# Patient Record
Sex: FEMALE | Race: BLACK OR AFRICAN AMERICAN | NOT HISPANIC OR LATINO | Employment: UNEMPLOYED | URBAN - METROPOLITAN AREA
[De-identification: names, ages, dates, MRNs, and addresses within clinical notes are randomized per-mention and may not be internally consistent; named-entity substitution may affect disease eponyms.]

---

## 2023-04-04 ENCOUNTER — APPOINTMENT (OUTPATIENT)
Dept: PRIMARY CARE | Facility: CLINIC | Age: 63
End: 2023-04-04
Payer: COMMERCIAL

## 2023-04-04 PROBLEM — I10 BENIGN ESSENTIAL HYPERTENSION: Status: ACTIVE | Noted: 2023-04-04

## 2023-04-04 PROBLEM — J45.909 ASTHMA (HHS-HCC): Status: ACTIVE | Noted: 2023-04-04

## 2023-04-04 PROBLEM — M54.9 BACK PAIN: Status: ACTIVE | Noted: 2023-04-04

## 2023-04-04 PROBLEM — H53.001 AMBLYOPIA, RIGHT EYE: Status: ACTIVE | Noted: 2023-04-04

## 2023-04-04 PROBLEM — H50.111 EXOTROPIA, RIGHT EYE: Status: ACTIVE | Noted: 2023-04-04

## 2023-04-04 PROBLEM — B96.89 BACTERIAL VAGINOSIS: Status: ACTIVE | Noted: 2023-04-04

## 2023-04-04 PROBLEM — R39.9 UTI SYMPTOMS: Status: ACTIVE | Noted: 2023-04-04

## 2023-04-04 PROBLEM — R29.898 WEAKNESS OF LEFT LOWER EXTREMITY: Status: ACTIVE | Noted: 2023-04-04

## 2023-04-04 PROBLEM — N89.8 VAGINAL DISCHARGE: Status: ACTIVE | Noted: 2023-04-04

## 2023-04-04 PROBLEM — J30.9 ALLERGIC RHINITIS: Status: ACTIVE | Noted: 2023-04-04

## 2023-04-04 PROBLEM — N89.8 VAGINAL ITCHING: Status: ACTIVE | Noted: 2023-04-04

## 2023-04-04 PROBLEM — B37.9 YEAST INFECTION: Status: ACTIVE | Noted: 2023-04-04

## 2023-04-04 PROBLEM — R07.89 ATYPICAL CHEST PAIN: Status: ACTIVE | Noted: 2023-04-04

## 2023-04-04 PROBLEM — F41.8 DEPRESSION WITH ANXIETY: Status: ACTIVE | Noted: 2023-04-04

## 2023-04-04 PROBLEM — M79.605 LEFT LEG PAIN: Status: ACTIVE | Noted: 2023-04-04

## 2023-04-04 PROBLEM — H33.301 RETINAL HOLE OR TEAR, RIGHT: Status: ACTIVE | Noted: 2023-04-04

## 2023-04-04 PROBLEM — Q14.1 CONGENITAL HYPERTROPHY OF RETINAL PIGMENT EPITHELIUM: Status: ACTIVE | Noted: 2023-04-04

## 2023-04-04 PROBLEM — F43.9 STRESS: Status: ACTIVE | Noted: 2023-04-04

## 2023-04-04 PROBLEM — L30.4 INTERTRIGO: Status: ACTIVE | Noted: 2023-04-04

## 2023-04-04 PROBLEM — K59.00 CONSTIPATION: Status: ACTIVE | Noted: 2023-04-04

## 2023-04-04 PROBLEM — H25.813 COMBINED FORM OF SENILE CATARACT OF BOTH EYES: Status: ACTIVE | Noted: 2023-04-04

## 2023-04-04 PROBLEM — M23.303 DERANGEMENT OF MEDIAL MENISCUS OF RIGHT KNEE: Status: ACTIVE | Noted: 2023-04-04

## 2023-04-04 PROBLEM — M25.512 LEFT SHOULDER PAIN: Status: ACTIVE | Noted: 2023-04-04

## 2023-04-04 PROBLEM — T14.8XXA BRUISING: Status: ACTIVE | Noted: 2023-04-04

## 2023-04-04 PROBLEM — E11.9 DIABETES MELLITUS TYPE 2 WITHOUT RETINOPATHY (MULTI): Status: ACTIVE | Noted: 2023-04-04

## 2023-04-04 PROBLEM — M25.561 RIGHT KNEE PAIN: Status: ACTIVE | Noted: 2023-04-04

## 2023-04-04 PROBLEM — H40.003 GLAUCOMA SUSPECT, BOTH EYES: Status: ACTIVE | Noted: 2023-04-04

## 2023-04-04 PROBLEM — N76.0 BACTERIAL VAGINOSIS: Status: ACTIVE | Noted: 2023-04-04

## 2023-04-04 PROBLEM — M25.552 LEFT HIP PAIN: Status: ACTIVE | Noted: 2023-04-04

## 2023-04-04 PROBLEM — R30.0 DYSURIA: Status: ACTIVE | Noted: 2023-04-04

## 2023-04-04 PROBLEM — R05.3 CHRONIC COUGH: Status: ACTIVE | Noted: 2023-04-04

## 2023-04-04 PROBLEM — M54.10 RADICULOPATHY OF LEG: Status: ACTIVE | Noted: 2023-04-04

## 2023-04-04 PROBLEM — Z96.1 PSEUDOPHAKIA: Status: ACTIVE | Noted: 2023-04-04

## 2023-04-04 PROBLEM — K21.9 GERD (GASTROESOPHAGEAL REFLUX DISEASE): Status: ACTIVE | Noted: 2023-04-04

## 2023-04-04 PROBLEM — N39.0 UTI (URINARY TRACT INFECTION): Status: ACTIVE | Noted: 2023-04-04

## 2023-04-04 PROBLEM — M17.11 PRIMARY LOCALIZED OSTEOARTHROSIS OF RIGHT LOWER LEG: Status: ACTIVE | Noted: 2023-04-04

## 2023-04-04 PROBLEM — G47.00 INSOMNIA: Status: ACTIVE | Noted: 2023-04-04

## 2023-04-04 PROBLEM — E66.9 OBESITY (BMI 30-39.9): Status: ACTIVE | Noted: 2023-04-04

## 2023-04-04 PROBLEM — M10.9 GOUT: Status: ACTIVE | Noted: 2023-04-04

## 2023-04-04 PROBLEM — E11.40 DIABETIC NEUROPATHY (MULTI): Status: ACTIVE | Noted: 2023-04-04

## 2023-04-04 PROBLEM — M1A.0510 CHRONIC GOUT OF RIGHT HIP: Status: ACTIVE | Noted: 2023-04-04

## 2023-04-04 PROBLEM — H02.889 MGD (MEIBOMIAN GLAND DISEASE): Status: ACTIVE | Noted: 2023-04-04

## 2023-04-04 PROBLEM — R82.90 BAD ODOR OF URINE: Status: ACTIVE | Noted: 2023-04-04

## 2023-04-04 RX ORDER — FLUTICASONE PROPIONATE 50 MCG
1-2 SPRAY, SUSPENSION (ML) NASAL DAILY
COMMUNITY
Start: 2020-07-07 | End: 2023-04-20 | Stop reason: SDUPTHER

## 2023-04-04 RX ORDER — PREDNISOLONE ACETATE 10 MG/ML
SUSPENSION/ DROPS OPHTHALMIC
COMMUNITY
Start: 2022-05-16 | End: 2024-01-26 | Stop reason: ALTCHOICE

## 2023-04-04 RX ORDER — DULAGLUTIDE 0.75 MG/.5ML
0.75 INJECTION, SOLUTION SUBCUTANEOUS
COMMUNITY
Start: 2022-07-19 | End: 2023-04-20 | Stop reason: SDUPTHER

## 2023-04-04 RX ORDER — IBUPROFEN 800 MG/1
1 TABLET ORAL 3 TIMES DAILY PRN
COMMUNITY
Start: 2021-07-22 | End: 2023-04-20 | Stop reason: SDUPTHER

## 2023-04-04 RX ORDER — CETIRIZINE HYDROCHLORIDE 10 MG/1
1 TABLET ORAL NIGHTLY
COMMUNITY
Start: 2013-08-21 | End: 2023-04-20 | Stop reason: SDUPTHER

## 2023-04-04 RX ORDER — BLOOD SUGAR DIAGNOSTIC
STRIP MISCELLANEOUS
COMMUNITY
End: 2024-01-26 | Stop reason: WASHOUT

## 2023-04-04 RX ORDER — BLOOD-GLUCOSE METER
KIT MISCELLANEOUS
COMMUNITY
Start: 2015-11-09 | End: 2023-04-20 | Stop reason: SDUPTHER

## 2023-04-04 RX ORDER — COLCHICINE 0.6 MG/1
TABLET ORAL
COMMUNITY
Start: 2021-07-22 | End: 2023-04-20 | Stop reason: SDUPTHER

## 2023-04-04 RX ORDER — AMLODIPINE BESYLATE 10 MG/1
1 TABLET ORAL DAILY
COMMUNITY
Start: 2014-08-05 | End: 2023-04-20 | Stop reason: SDUPTHER

## 2023-04-04 RX ORDER — BLOOD-GLUCOSE CONTROL, NORMAL
EACH MISCELLANEOUS
COMMUNITY
End: 2024-01-26 | Stop reason: WASHOUT

## 2023-04-04 RX ORDER — ACETAMINOPHEN 500 MG
2 TABLET ORAL EVERY 8 HOURS PRN
COMMUNITY
End: 2023-12-22 | Stop reason: WASHOUT

## 2023-04-04 RX ORDER — ATENOLOL AND CHLORTHALIDONE TABLET 50; 25 MG/1; MG/1
1 TABLET ORAL DAILY
COMMUNITY
Start: 2013-08-07 | End: 2023-04-20 | Stop reason: SDUPTHER

## 2023-04-04 RX ORDER — MONTELUKAST SODIUM 10 MG/1
1 TABLET ORAL NIGHTLY
COMMUNITY
Start: 2018-09-04 | End: 2023-04-20 | Stop reason: SDUPTHER

## 2023-04-04 RX ORDER — BUDESONIDE AND FORMOTEROL FUMARATE DIHYDRATE 160; 4.5 UG/1; UG/1
2 AEROSOL RESPIRATORY (INHALATION)
COMMUNITY
Start: 2020-10-07 | End: 2023-04-20 | Stop reason: SDUPTHER

## 2023-04-04 RX ORDER — GUAIFENESIN AND DEXTROMETHORPHAN HYDROBROMIDE 10; 100 MG/5ML; MG/5ML
10 SYRUP ORAL 3 TIMES DAILY PRN
COMMUNITY
Start: 2022-01-07

## 2023-04-04 RX ORDER — ISOPROPYL ALCOHOL 70 ML/100ML
SWAB TOPICAL
COMMUNITY
End: 2024-01-26 | Stop reason: WASHOUT

## 2023-04-04 RX ORDER — ALBUTEROL SULFATE 90 UG/1
1-2 AEROSOL, METERED RESPIRATORY (INHALATION) EVERY 6 HOURS PRN
COMMUNITY
Start: 2020-03-23 | End: 2023-04-20 | Stop reason: SDUPTHER

## 2023-04-04 RX ORDER — ALBUTEROL SULFATE 0.83 MG/ML
SOLUTION RESPIRATORY (INHALATION)
COMMUNITY
End: 2023-12-22 | Stop reason: SDUPTHER

## 2023-04-04 RX ORDER — TRAZODONE HYDROCHLORIDE 100 MG/1
1 TABLET ORAL NIGHTLY
COMMUNITY
Start: 2013-07-09 | End: 2023-04-20 | Stop reason: SDUPTHER

## 2023-04-04 RX ORDER — FLUTICASONE PROPIONATE 100 UG/1
1 POWDER, METERED RESPIRATORY (INHALATION) 2 TIMES DAILY
COMMUNITY
Start: 2018-12-04 | End: 2023-04-20 | Stop reason: SDUPTHER

## 2023-04-04 RX ORDER — FAMOTIDINE 20 MG/1
1 TABLET, FILM COATED ORAL DAILY PRN
COMMUNITY
Start: 2018-12-04 | End: 2023-04-20 | Stop reason: SDUPTHER

## 2023-04-04 RX ORDER — ATORVASTATIN CALCIUM 20 MG/1
80 TABLET, FILM COATED ORAL DAILY
COMMUNITY
Start: 2018-12-04 | End: 2023-04-20 | Stop reason: SDUPTHER

## 2023-04-14 ENCOUNTER — APPOINTMENT (OUTPATIENT)
Dept: PRIMARY CARE | Facility: CLINIC | Age: 63
End: 2023-04-14
Payer: COMMERCIAL

## 2023-04-20 ENCOUNTER — OFFICE VISIT (OUTPATIENT)
Dept: PRIMARY CARE | Facility: CLINIC | Age: 63
End: 2023-04-20
Payer: COMMERCIAL

## 2023-04-20 ENCOUNTER — LAB (OUTPATIENT)
Dept: LAB | Facility: LAB | Age: 63
End: 2023-04-20
Payer: COMMERCIAL

## 2023-04-20 VITALS
HEART RATE: 73 BPM | TEMPERATURE: 97.4 F | DIASTOLIC BLOOD PRESSURE: 83 MMHG | BODY MASS INDEX: 37.37 KG/M2 | WEIGHT: 224.3 LBS | HEIGHT: 65 IN | SYSTOLIC BLOOD PRESSURE: 143 MMHG | OXYGEN SATURATION: 98 %

## 2023-04-20 DIAGNOSIS — Z12.31 BREAST CANCER SCREENING BY MAMMOGRAM: ICD-10-CM

## 2023-04-20 DIAGNOSIS — I10 BENIGN ESSENTIAL HYPERTENSION: ICD-10-CM

## 2023-04-20 DIAGNOSIS — M25.569 KNEE PAIN, UNSPECIFIED CHRONICITY, UNSPECIFIED LATERALITY: ICD-10-CM

## 2023-04-20 DIAGNOSIS — K21.9 GASTROESOPHAGEAL REFLUX DISEASE, UNSPECIFIED WHETHER ESOPHAGITIS PRESENT: ICD-10-CM

## 2023-04-20 DIAGNOSIS — Z59.41 FOOD INSECURITY: ICD-10-CM

## 2023-04-20 DIAGNOSIS — M10.9 GOUT, UNSPECIFIED CAUSE, UNSPECIFIED CHRONICITY, UNSPECIFIED SITE: ICD-10-CM

## 2023-04-20 DIAGNOSIS — E11.9 DIABETES MELLITUS TYPE 2 WITHOUT RETINOPATHY (MULTI): Primary | ICD-10-CM

## 2023-04-20 DIAGNOSIS — G47.00 INSOMNIA, UNSPECIFIED TYPE: ICD-10-CM

## 2023-04-20 DIAGNOSIS — R53.83 FATIGUE, UNSPECIFIED TYPE: ICD-10-CM

## 2023-04-20 DIAGNOSIS — T78.40XD ALLERGY, SUBSEQUENT ENCOUNTER: ICD-10-CM

## 2023-04-20 DIAGNOSIS — J45.909 ASTHMA, UNSPECIFIED ASTHMA SEVERITY, UNSPECIFIED WHETHER COMPLICATED, UNSPECIFIED WHETHER PERSISTENT (HHS-HCC): ICD-10-CM

## 2023-04-20 DIAGNOSIS — R06.09 DOE (DYSPNEA ON EXERTION): ICD-10-CM

## 2023-04-20 DIAGNOSIS — E11.9 DIABETES MELLITUS TYPE 2 WITHOUT RETINOPATHY (MULTI): ICD-10-CM

## 2023-04-20 DIAGNOSIS — E78.5 HYPERLIPIDEMIA, UNSPECIFIED HYPERLIPIDEMIA TYPE: ICD-10-CM

## 2023-04-20 LAB
ESTIMATED AVERAGE GLUCOSE FOR HBA1C: 154 MG/DL
HEMOGLOBIN A1C/HEMOGLOBIN TOTAL IN BLOOD: 7 %

## 2023-04-20 PROCEDURE — 3079F DIAST BP 80-89 MM HG: CPT | Performed by: STUDENT IN AN ORGANIZED HEALTH CARE EDUCATION/TRAINING PROGRAM

## 2023-04-20 PROCEDURE — 3077F SYST BP >= 140 MM HG: CPT | Performed by: STUDENT IN AN ORGANIZED HEALTH CARE EDUCATION/TRAINING PROGRAM

## 2023-04-20 PROCEDURE — 99214 OFFICE O/P EST MOD 30 MIN: CPT | Performed by: STUDENT IN AN ORGANIZED HEALTH CARE EDUCATION/TRAINING PROGRAM

## 2023-04-20 PROCEDURE — 3051F HG A1C>EQUAL 7.0%<8.0%: CPT | Performed by: STUDENT IN AN ORGANIZED HEALTH CARE EDUCATION/TRAINING PROGRAM

## 2023-04-20 PROCEDURE — 83036 HEMOGLOBIN GLYCOSYLATED A1C: CPT

## 2023-04-20 PROCEDURE — 36415 COLL VENOUS BLD VENIPUNCTURE: CPT

## 2023-04-20 PROCEDURE — 1036F TOBACCO NON-USER: CPT | Performed by: STUDENT IN AN ORGANIZED HEALTH CARE EDUCATION/TRAINING PROGRAM

## 2023-04-20 RX ORDER — ATENOLOL AND CHLORTHALIDONE TABLET 50; 25 MG/1; MG/1
1 TABLET ORAL DAILY
Qty: 90 TABLET | Refills: 3 | Status: SHIPPED | OUTPATIENT
Start: 2023-04-20 | End: 2023-11-01 | Stop reason: SDUPTHER

## 2023-04-20 RX ORDER — CETIRIZINE HYDROCHLORIDE 10 MG/1
10 TABLET ORAL NIGHTLY
Qty: 90 TABLET | Refills: 1 | Status: SHIPPED | OUTPATIENT
Start: 2023-04-20 | End: 2023-11-01 | Stop reason: SDUPTHER

## 2023-04-20 RX ORDER — BUDESONIDE AND FORMOTEROL FUMARATE DIHYDRATE 160; 4.5 UG/1; UG/1
2 AEROSOL RESPIRATORY (INHALATION)
Qty: 10.2 G | Refills: 0 | Status: SHIPPED | OUTPATIENT
Start: 2023-04-20 | End: 2023-07-21

## 2023-04-20 RX ORDER — FLASH GLUCOSE SENSOR
KIT MISCELLANEOUS
Qty: 1 EACH | Refills: 0 | Status: SHIPPED | OUTPATIENT
Start: 2023-04-20 | End: 2024-01-26 | Stop reason: SDUPTHER

## 2023-04-20 RX ORDER — DULAGLUTIDE 0.75 MG/.5ML
0.75 INJECTION, SOLUTION SUBCUTANEOUS
Qty: 4 EACH | Refills: 2 | Status: SHIPPED | OUTPATIENT
Start: 2023-04-20 | End: 2023-06-20 | Stop reason: SDUPTHER

## 2023-04-20 RX ORDER — MONTELUKAST SODIUM 10 MG/1
10 TABLET ORAL NIGHTLY
Qty: 90 TABLET | Refills: 1 | Status: SHIPPED | OUTPATIENT
Start: 2023-04-20 | End: 2023-12-22 | Stop reason: SDUPTHER

## 2023-04-20 RX ORDER — AMLODIPINE BESYLATE 10 MG/1
10 TABLET ORAL DAILY
Qty: 90 TABLET | Refills: 3 | Status: SHIPPED | OUTPATIENT
Start: 2023-04-20 | End: 2023-11-01 | Stop reason: SDUPTHER

## 2023-04-20 RX ORDER — TRAZODONE HYDROCHLORIDE 100 MG/1
100 TABLET ORAL NIGHTLY
Qty: 90 TABLET | Refills: 0 | Status: SHIPPED | OUTPATIENT
Start: 2023-04-20 | End: 2023-12-22 | Stop reason: SDUPTHER

## 2023-04-20 RX ORDER — BLOOD-GLUCOSE METER
1 KIT MISCELLANEOUS
Qty: 200 STRIP | Refills: 3 | Status: SHIPPED | OUTPATIENT
Start: 2023-04-20

## 2023-04-20 RX ORDER — FLUTICASONE PROPIONATE 50 MCG
1-2 SPRAY, SUSPENSION (ML) NASAL DAILY
Qty: 16 G | Refills: 0 | Status: SHIPPED | OUTPATIENT
Start: 2023-04-20

## 2023-04-20 RX ORDER — FLUTICASONE PROPIONATE 100 UG/1
1 POWDER, METERED RESPIRATORY (INHALATION)
Qty: 60 EACH | Refills: 3 | Status: SHIPPED | OUTPATIENT
Start: 2023-04-20 | End: 2023-12-22 | Stop reason: ALTCHOICE

## 2023-04-20 RX ORDER — COLCHICINE 0.6 MG/1
0.6 TABLET ORAL 2 TIMES DAILY
Qty: 90 TABLET | Refills: 0 | Status: SHIPPED | OUTPATIENT
Start: 2023-04-20 | End: 2023-11-01 | Stop reason: SDUPTHER

## 2023-04-20 RX ORDER — INSULIN PUMP SYRINGE, 3 ML
EACH MISCELLANEOUS
Qty: 1 EACH | Refills: 0 | Status: SHIPPED | OUTPATIENT
Start: 2023-04-20 | End: 2024-01-26 | Stop reason: WASHOUT

## 2023-04-20 RX ORDER — ATORVASTATIN CALCIUM 20 MG/1
80 TABLET, FILM COATED ORAL DAILY
Qty: 90 TABLET | Refills: 3 | Status: SHIPPED | OUTPATIENT
Start: 2023-04-20 | End: 2023-12-22 | Stop reason: SDUPTHER

## 2023-04-20 RX ORDER — FAMOTIDINE 20 MG/1
20 TABLET, FILM COATED ORAL DAILY PRN
Qty: 90 TABLET | Refills: 3 | Status: SHIPPED | OUTPATIENT
Start: 2023-04-20 | End: 2023-12-22 | Stop reason: SDUPTHER

## 2023-04-20 RX ORDER — IBUPROFEN 800 MG/1
800 TABLET ORAL 3 TIMES DAILY PRN
Qty: 90 TABLET | Refills: 1 | Status: SHIPPED | OUTPATIENT
Start: 2023-04-20 | End: 2023-11-01 | Stop reason: SDUPTHER

## 2023-04-20 RX ORDER — ALBUTEROL SULFATE 90 UG/1
1-2 AEROSOL, METERED RESPIRATORY (INHALATION) EVERY 6 HOURS PRN
Qty: 18 G | Refills: 11 | Status: SHIPPED | OUTPATIENT
Start: 2023-04-20 | End: 2023-12-22 | Stop reason: SDUPTHER

## 2023-04-20 SDOH — ECONOMIC STABILITY - FOOD INSECURITY: FOOD INSECURITY: Z59.41

## 2023-04-20 ASSESSMENT — PAIN SCALES - GENERAL: PAINLEVEL: 0-NO PAIN

## 2023-04-20 NOTE — PROGRESS NOTES
HPI    Diabetic check  - Endorses that patient has had difficulty checking her sugars at home  - Takes Truclitiy 0.75mg weekly  - Last A1C% of 7.0 from 01/24/23  - IO glucose in the 100s    Blood pressure Check  - Requesting refills of blood pressure medications  - Denies any headaches, acute vision loss, or any other acute symptoms    ROJAS  - Started 2 days ago  - Occurred while walking up the steps carrying grocery  - Denies taking her inhaler at the start of symptoms  - Alleviated with rest   - Denies any chest pain with this episode  - Denies having a stress test    Review of systems  - Negative unless mentioned in HPI    Physical Exam  Constitutional: Well developed, awake, alert, oriented x3  Head and Face: NCAT  Eyes: Normal external exam, EOMI  ENT: Normal external inspection of ears and nose. Oropharynx normal.  Cardiovascular: RRR, S1/S2, no murmurs, rubs, or gallops, radial pulses +2, no edema of extremities  Pulmonary: CTAB, no respiratory distress.  Abdomen: +BS, soft, non-tender, nondistended, no guarding or rebound, no masses noted  Neuro: A&O x3, CN II-XII grossly intact  MSK: No joint swelling, normal movements of all extremities. Range of motion- normal.  Skin- No lesions, contusions, or erythema.  Psychiatric: Judgment intact. Appropriate mood and behavior     Assessment/Plan    62-year-old female presenting to the clinic today for diabetic and blood pressure check.  Review of systems also notable for dyspnea on exertion which concerning for cardiac origin in the setting of a diagnosis of diabetes.    #DM  -Diabetic medications reviewed and reconciled  -Freestyle columba glucose monitoring sent to pharmacy  -Repeat A1c ordered  -A1c of 7.0 from 01/24/2023 signifies that patient has appropriate glycemic control at this time  -ASCVD risk of 26.2; atorvastatin increased from 20 mg daily increased to 80 mg daily  -Food for life referral made    #HTN  -Hypertensive reviewed and reconciled  -No changes to  blood pressure regimen at this time; continue with amlodipine 10 mg daily and atenolol-chlorthalidone 50-25 mg daily    #Dyspnea on exertion  #Fatigue  -Refill prescription of albuterol inhaler  -Cardiac stress test ordered  -Sleep medicine referral made    Discussed with Dr. Maria L Huertas MD PGY-3  [facility]  [facility]

## 2023-04-28 NOTE — PROGRESS NOTES
I reviewed with the resident the medical history and the resident’s findings on physical examination.  I discussed with the resident the patient’s diagnosis and concur with the treatment plan as documented in the resident note.     Mk Lisa MD

## 2023-06-20 DIAGNOSIS — E11.9 DIABETES MELLITUS TYPE 2 WITHOUT RETINOPATHY (MULTI): ICD-10-CM

## 2023-06-20 RX ORDER — DULAGLUTIDE 0.75 MG/.5ML
0.75 INJECTION, SOLUTION SUBCUTANEOUS
Qty: 4 EACH | Refills: 2 | Status: SHIPPED | OUTPATIENT
Start: 2023-06-20 | End: 2023-08-15 | Stop reason: SDUPTHER

## 2023-07-07 ENCOUNTER — OFFICE VISIT (OUTPATIENT)
Dept: PRIMARY CARE | Facility: CLINIC | Age: 63
End: 2023-07-07
Payer: COMMERCIAL

## 2023-07-07 VITALS
DIASTOLIC BLOOD PRESSURE: 76 MMHG | HEIGHT: 65 IN | WEIGHT: 224.8 LBS | SYSTOLIC BLOOD PRESSURE: 141 MMHG | RESPIRATION RATE: 18 BRPM | OXYGEN SATURATION: 93 % | BODY MASS INDEX: 37.45 KG/M2 | TEMPERATURE: 97.9 F | HEART RATE: 72 BPM

## 2023-07-07 DIAGNOSIS — E11.9 DIABETES MELLITUS TYPE 2 WITHOUT RETINOPATHY (MULTI): ICD-10-CM

## 2023-07-07 DIAGNOSIS — M10.9 GOUT, UNSPECIFIED CAUSE, UNSPECIFIED CHRONICITY, UNSPECIFIED SITE: Primary | ICD-10-CM

## 2023-07-07 LAB
ALBUMIN (MG/L) IN URINE: <7 MG/L
ALBUMIN/CREATININE (UG/MG) IN URINE: NORMAL UG/MG CRT (ref 0–30)
CREATININE (MG/DL) IN URINE: 200 MG/DL (ref 20–320)

## 2023-07-07 PROCEDURE — 3077F SYST BP >= 140 MM HG: CPT | Performed by: STUDENT IN AN ORGANIZED HEALTH CARE EDUCATION/TRAINING PROGRAM

## 2023-07-07 PROCEDURE — 99213 OFFICE O/P EST LOW 20 MIN: CPT | Performed by: STUDENT IN AN ORGANIZED HEALTH CARE EDUCATION/TRAINING PROGRAM

## 2023-07-07 PROCEDURE — 82570 ASSAY OF URINE CREATININE: CPT

## 2023-07-07 PROCEDURE — 1036F TOBACCO NON-USER: CPT | Performed by: STUDENT IN AN ORGANIZED HEALTH CARE EDUCATION/TRAINING PROGRAM

## 2023-07-07 PROCEDURE — 3051F HG A1C>EQUAL 7.0%<8.0%: CPT | Performed by: STUDENT IN AN ORGANIZED HEALTH CARE EDUCATION/TRAINING PROGRAM

## 2023-07-07 PROCEDURE — 3078F DIAST BP <80 MM HG: CPT | Performed by: STUDENT IN AN ORGANIZED HEALTH CARE EDUCATION/TRAINING PROGRAM

## 2023-07-07 PROCEDURE — 82043 UR ALBUMIN QUANTITATIVE: CPT

## 2023-07-07 ASSESSMENT — PAIN SCALES - GENERAL: PAINLEVEL: 0-NO PAIN

## 2023-07-07 NOTE — PROGRESS NOTES
"Subjective   Patient ID: Dot Sevilla is a 62 y.o. female who presents for Follow-up.  HPI  #HLD  -needs refill to 80 mg tablets of Atorvastatin    #asthma  -albuterol  -Singular  Flovent to be stopped today   -Symbicort    #Dm  Doesn't test her sugars at home   Trulicity once a weeks  Needs sensor for freestyle columba   She only has the monitor     #hx of gout  Takes colchicine 0.6 mg daily BID    #arthritis pain  Takes ibuprofen 800 mg as needed   Tylenol doesn't help her     #insomnia  -trazodone prn      Review of Systems  ROS negative except for above mentioned.    Objective   /76 (BP Location: Left arm, Patient Position: Sitting, BP Cuff Size: Adult)   Pulse 72   Temp 36.6 °C (97.9 °F) (Temporal)   Resp 18   Ht 1.651 m (5' 5\")   Wt 102 kg (224 lb 12.8 oz)   SpO2 93%   BMI 37.41 kg/m²     Physical Exam  General: Well developed, well nourished, alert and cooperative, appears to be in no acute distress.   HEENT: Normocephalic, atraumatic.   Cardiac: Normal S1 and S2. No S3, S4, or murmurs.   Lungs: Clear to auscultation bilaterally.   MSK: Moves all extremities spontaneously  Neuro: No focal deficits noted  Skin: Skin normal color, texture, and turgor with no lesions or eruptions.   Psych: Oriented to person, place, and time.   Assessment/Plan   Problem List Items Addressed This Visit    None  A 62 y o F with a PMH of T2DM, Asthma, HTN, gout presents for a follow up visit.     #HTN  Chronic, stable  -cw current regimen    #T2DM  Chronic, stable   Last A1C 7% 4/23  -cw current regimen  Urine albumin pending    #Hx of gout  -cw with colchicine for acute flares  Uric acid level pending    #hx of asthma  Chronic, stable  -cw albuterol as needed  -controller regimen: Symbicort and Singulair)  -stop flovent as patient already on steriod regimen with symbicort    RTC in 3 months    Patient discussed with attending, Dr. Bharathi Almaraz MD  Family Medicine, PGY-3          "

## 2023-07-20 DIAGNOSIS — J45.909 ASTHMA, UNSPECIFIED ASTHMA SEVERITY, UNSPECIFIED WHETHER COMPLICATED, UNSPECIFIED WHETHER PERSISTENT (HHS-HCC): ICD-10-CM

## 2023-07-21 RX ORDER — BUDESONIDE AND FORMOTEROL FUMARATE DIHYDRATE 160; 4.5 UG/1; UG/1
AEROSOL RESPIRATORY (INHALATION)
Qty: 10.2 G | Refills: 0 | Status: SHIPPED | OUTPATIENT
Start: 2023-07-21 | End: 2023-12-22 | Stop reason: SDUPTHER

## 2023-07-31 NOTE — PROGRESS NOTES
I reviewed with the resident the medical history and the resident’s findings on physical examination.  I discussed with the resident the patient’s diagnosis and concur with the treatment plan as documented in the resident note.     Yuniel Garvin MD

## 2023-08-15 ENCOUNTER — OFFICE VISIT (OUTPATIENT)
Dept: PRIMARY CARE | Facility: CLINIC | Age: 63
End: 2023-08-15
Payer: COMMERCIAL

## 2023-08-15 ENCOUNTER — LAB (OUTPATIENT)
Dept: LAB | Facility: LAB | Age: 63
End: 2023-08-15
Payer: COMMERCIAL

## 2023-08-15 VITALS
HEART RATE: 69 BPM | WEIGHT: 225 LBS | TEMPERATURE: 97.3 F | HEIGHT: 65 IN | SYSTOLIC BLOOD PRESSURE: 134 MMHG | DIASTOLIC BLOOD PRESSURE: 76 MMHG | BODY MASS INDEX: 37.49 KG/M2 | OXYGEN SATURATION: 97 %

## 2023-08-15 DIAGNOSIS — I27.20 PULMONARY HYPERTENSION (MULTI): Primary | ICD-10-CM

## 2023-08-15 DIAGNOSIS — E11.9 DIABETES MELLITUS TYPE 2 WITHOUT RETINOPATHY (MULTI): ICD-10-CM

## 2023-08-15 DIAGNOSIS — R26.81 GAIT INSTABILITY: ICD-10-CM

## 2023-08-15 LAB
ESTIMATED AVERAGE GLUCOSE FOR HBA1C: 166 MG/DL
HEMOGLOBIN A1C/HEMOGLOBIN TOTAL IN BLOOD: 7.4 %
URATE (MG/DL) IN SER/PLAS: 8.7 MG/DL (ref 2.3–6.7)

## 2023-08-15 PROCEDURE — 1036F TOBACCO NON-USER: CPT | Performed by: STUDENT IN AN ORGANIZED HEALTH CARE EDUCATION/TRAINING PROGRAM

## 2023-08-15 PROCEDURE — 83036 HEMOGLOBIN GLYCOSYLATED A1C: CPT

## 2023-08-15 PROCEDURE — 3075F SYST BP GE 130 - 139MM HG: CPT | Performed by: STUDENT IN AN ORGANIZED HEALTH CARE EDUCATION/TRAINING PROGRAM

## 2023-08-15 PROCEDURE — 36415 COLL VENOUS BLD VENIPUNCTURE: CPT

## 2023-08-15 PROCEDURE — 99214 OFFICE O/P EST MOD 30 MIN: CPT | Performed by: STUDENT IN AN ORGANIZED HEALTH CARE EDUCATION/TRAINING PROGRAM

## 2023-08-15 PROCEDURE — 84550 ASSAY OF BLOOD/URIC ACID: CPT

## 2023-08-15 PROCEDURE — 3051F HG A1C>EQUAL 7.0%<8.0%: CPT | Performed by: STUDENT IN AN ORGANIZED HEALTH CARE EDUCATION/TRAINING PROGRAM

## 2023-08-15 PROCEDURE — 3078F DIAST BP <80 MM HG: CPT | Performed by: STUDENT IN AN ORGANIZED HEALTH CARE EDUCATION/TRAINING PROGRAM

## 2023-08-15 RX ORDER — COVID-19 ANTIGEN TEST
KIT MISCELLANEOUS
COMMUNITY
Start: 2023-07-27 | End: 2024-01-26 | Stop reason: WASHOUT

## 2023-08-15 RX ORDER — GLY/DIMETH/PETROLAT,WHT/WATER
CREAM (GRAM) TOPICAL
COMMUNITY
Start: 2023-07-27 | End: 2024-01-26 | Stop reason: WASHOUT

## 2023-08-15 RX ORDER — DULAGLUTIDE 0.75 MG/.5ML
0.75 INJECTION, SOLUTION SUBCUTANEOUS
Qty: 4 EACH | Refills: 11 | Status: SHIPPED | OUTPATIENT
Start: 2023-08-15 | End: 2023-11-01 | Stop reason: SDUPTHER

## 2023-08-15 ASSESSMENT — PAIN SCALES - GENERAL: PAINLEVEL: 5

## 2023-08-18 NOTE — PROGRESS NOTES
I saw and evaluated the patient. I personally obtained the key and critical portions of the history and physical exam or was physically present for key and critical portions performed by the resident/fellow. I reviewed the resident/fellow's documentation and discussed the patient with the resident/fellow. I agree with the resident/fellow's medical decision making as documented in the note.  She did not have inflamed knee joints, as in gout attack.  She was able to rise from chair using hands lightly.  Not using cane today though this has been recommended.  Has at least 4+/5 strength in proximal and distal Legs.      Aaliyah Taylor MD

## 2023-08-26 LAB
CHLAMYDIA TRACH., AMPLIFIED: NEGATIVE
N. GONORRHEA, AMPLIFIED: NEGATIVE
TRICHOMONAS VAGINALIS: NEGATIVE

## 2023-08-28 LAB
CLUE CELLS: NORMAL
NUGENT SCORE: 0
YEAST: NORMAL

## 2023-10-19 ENCOUNTER — TELEPHONE (OUTPATIENT)
Dept: PRIMARY CARE | Facility: CLINIC | Age: 63
End: 2023-10-19

## 2023-10-19 ENCOUNTER — APPOINTMENT (OUTPATIENT)
Dept: PULMONOLOGY | Facility: HOSPITAL | Age: 63
End: 2023-10-19
Payer: COMMERCIAL

## 2023-10-19 DIAGNOSIS — Z59.41 FOOD INSECURITY: Primary | ICD-10-CM

## 2023-10-19 SDOH — ECONOMIC STABILITY - FOOD INSECURITY: FOOD INSECURITY: Z59.41

## 2023-11-01 DIAGNOSIS — T78.40XD ALLERGY, SUBSEQUENT ENCOUNTER: ICD-10-CM

## 2023-11-01 DIAGNOSIS — E11.9 DIABETES MELLITUS TYPE 2 WITHOUT RETINOPATHY (MULTI): ICD-10-CM

## 2023-11-01 DIAGNOSIS — M10.9 GOUT, UNSPECIFIED CAUSE, UNSPECIFIED CHRONICITY, UNSPECIFIED SITE: ICD-10-CM

## 2023-11-01 DIAGNOSIS — M25.569 KNEE PAIN, UNSPECIFIED CHRONICITY, UNSPECIFIED LATERALITY: ICD-10-CM

## 2023-11-01 DIAGNOSIS — I10 BENIGN ESSENTIAL HYPERTENSION: ICD-10-CM

## 2023-11-01 RX ORDER — IBUPROFEN 800 MG/1
800 TABLET ORAL 3 TIMES DAILY PRN
Qty: 90 TABLET | Refills: 0 | Status: SHIPPED | OUTPATIENT
Start: 2023-11-01 | End: 2024-04-23 | Stop reason: WASHOUT

## 2023-11-01 RX ORDER — CETIRIZINE HYDROCHLORIDE 10 MG/1
10 TABLET ORAL NIGHTLY
Qty: 90 TABLET | Refills: 0 | Status: SHIPPED | OUTPATIENT
Start: 2023-11-01 | End: 2023-12-22 | Stop reason: SDUPTHER

## 2023-11-01 RX ORDER — COLCHICINE 0.6 MG/1
0.6 TABLET ORAL 2 TIMES DAILY
Qty: 90 TABLET | Refills: 0 | Status: SHIPPED | OUTPATIENT
Start: 2023-11-01 | End: 2023-12-22 | Stop reason: SDUPTHER

## 2023-11-01 RX ORDER — AMLODIPINE BESYLATE 10 MG/1
10 TABLET ORAL DAILY
Qty: 90 TABLET | Refills: 0 | Status: SHIPPED | OUTPATIENT
Start: 2023-11-01 | End: 2023-12-22 | Stop reason: SDUPTHER

## 2023-11-01 RX ORDER — ATENOLOL AND CHLORTHALIDONE TABLET 50; 25 MG/1; MG/1
1 TABLET ORAL DAILY
Qty: 90 TABLET | Refills: 0 | Status: SHIPPED | OUTPATIENT
Start: 2023-11-01 | End: 2023-12-22 | Stop reason: SDUPTHER

## 2023-11-01 RX ORDER — DULAGLUTIDE 0.75 MG/.5ML
0.75 INJECTION, SOLUTION SUBCUTANEOUS
Qty: 4 EACH | Refills: 2 | Status: SHIPPED | OUTPATIENT
Start: 2023-11-01 | End: 2023-12-22 | Stop reason: SDUPTHER

## 2023-11-01 NOTE — TELEPHONE ENCOUNTER
Pt needs a refill on trulicity, colchicine, ibuprofen, cetirizine, amlodopine, and atenolol. She has an appt 12/22.

## 2023-11-10 ENCOUNTER — APPOINTMENT (OUTPATIENT)
Dept: PRIMARY CARE | Facility: CLINIC | Age: 63
End: 2023-11-10

## 2023-11-28 ENCOUNTER — TELEMEDICINE CLINICAL SUPPORT (OUTPATIENT)
Dept: GENETICS | Facility: CLINIC | Age: 63
End: 2023-11-28
Payer: COMMERCIAL

## 2023-11-28 DIAGNOSIS — Z80.3 FAMILY HISTORY OF BREAST CANCER: ICD-10-CM

## 2023-11-28 DIAGNOSIS — Z80.0 FAMILY HISTORY OF PANCREATIC CANCER: ICD-10-CM

## 2023-11-28 DIAGNOSIS — Z80.1 FAMILY HISTORY OF LUNG CANCER: ICD-10-CM

## 2023-11-28 DIAGNOSIS — Z13.71 ENCOUNTER FOR NONPROCREATIVE GENETIC COUNSELING AND TESTING: Primary | ICD-10-CM

## 2023-11-28 DIAGNOSIS — Z80.0 FAMILY HISTORY OF COLON CANCER: ICD-10-CM

## 2023-11-28 DIAGNOSIS — Z71.83 ENCOUNTER FOR NONPROCREATIVE GENETIC COUNSELING AND TESTING: Primary | ICD-10-CM

## 2023-11-28 PROCEDURE — GENMD PR GENETICS VISIT (MEDICAID/MEDICARE): Performed by: GENETIC COUNSELOR, MS

## 2023-11-28 NOTE — PROGRESS NOTES
"History of Present Illness:  Dot Sevilla \"Cande" is a 63 y.o. female with a family history of various cancers, including breast, colon, pancreatic, and lung cancer. Ms. Sevilla was referred to the Cancer Genetics Clinic at [facility] by ARNALDO Morrissey.  Ms. Sevilla is interested in genetic testing to clarify her personal risk for cancer, as well as the risks to her family members.    Cancer medical history:  Personal history of cancer? No     Prior genetic testing? No     Cancer screening history:  Mammograms? Annual, 23 - negative.    Patient denies personal history of breast biopsy.   PAP smear? Not recent, s/p hysterectomy   Colonoscopy? She has not had a colonoscopy.  Plans to discuss with PCP.    Upper endoscopy? No  Dermatology? Saw derm a while ago for eczema   Other cancer screening? No    Reproductive history:  Number of children: 4  Number of pregnancies: 4  Age first birth: 13  Breast feeding? No   Menarche (age): 12  Menopause (age):  late 40s due to hysterectomy  OCP: Yes , about 1 year  HRT: No     Hysterectomy (with BSO?) due to fibroids in . She reports this was done at .       Family history:  A 4-generation pedigree was obtained and was significant for the following:   - Daughter, breast cancer at age 27  - Niece, breast cancer, two different types, diagnosed at age 27, with recent recurrence at age 45   - Sister, lung cancer at age 60, colon cancer at age 63, nonsmoker  - Mother, cervical cancer at uncertain age (60s?)  - Maternal aunt, colon cancer at uncertain age (>age 50)   - Maternal cousin, pancreatic cancer in his mid 50s, nonsmoker  - Maternal aunt, lung cancer at age 72, +smoker  - Maternal uncle,  of cancer, uncertain type, in his 60s    She is not aware of any relatives who have had genetic testing.     Maternal ancestry is  and .  Paternal ancestry is . There is no known Ashkenazi Voodoo ancestry. " Consanguinity was denied.       Genetic Counseling:  Dot Sevilla is a 63 y.o. female with a family history of breast cancer, colon cancer, and pancreatic cancer.  We discussed that the family history is suspicious for an inherited predisposition to cancer.  She does not think that any of her relatives with cancer would be open to genetic testing.  Based on her family history of early-onset breast cancer, Ms. Sevilla meets current national (NCCN) criteria for testing of high-penetrance breast cancer susceptibility genes, including BRCA1, BRCA2, CDH1, PALB2, PTEN, and TP53.  Testing is medically necessary, as it will help determine if Ms. Sevilla is a candidate for increased cancer surveillance or options to reduce the risk for cancer.     We reviewed genes and the concept of hereditary cancer.  We discussed that most cancers are not due to an inherited genetic susceptibility.  However, in about 5-10% of cases of cancer, there is an inherited genetic mutation that can make a person more susceptible to developing certain forms of cancer.  Within these families, we often see multiple family members with cancer, occurring in multiple generations.  In addition, earlier onset and bilateral cancers can be suggestive of hereditary cancer.  Finally, there is typically a clustering of certain types of cancer in these families, such as breast and ovarian cancer.    For example, we discussed the BRCA1 and BRCA2 genes, which are two genes that have been linked to hereditary breast and ovarian cancer. Mutations in these genes are inherited in an autosomal dominant pattern and confer an estimated 60-80% lifetime risk for breast cancer in women. This is elevated compared to the general population risk of 10-12%. In addition, female BRCA1/2 mutation carriers have up to a 45% lifetime risk for ovarian cancer, which is elevated over the 1-2% general population risk. Mutation carriers who have already been diagnosed with breast cancer have  an increased risk to develop a second primary breast cancer. Mutations in BRCA1 and BRCA2 have also been associated with an increased risk for male breast cancer, prostate cancer, and pancreatic cancer.     We discussed that there are multiple genes associated with increased breast cancer risk, colon cancer risk, and/or pancreatic cancer risk. Some genes, like the BRCA genes, are considered highly penetrant cancer susceptibility genes, meaning a mutation in the gene confers a high risk of certain cancers. On the other hand, there are other intermediately penetrant (moderate risk) cancer susceptibility genes. For many of the moderate risk genes, there is sometimes limited information regarding the degree to which a mutation in the gene affects risk of different types of cancers. Additionally, for some of these moderate risk genes, the appropriate management for individuals who have a mutation in one of these genes is not always clear. In many cases, if an individual tests positive for a mutation in a moderate risk gene, recommendations are based on family history.    We also reviewed the most informative approach to testing for a genetic predisposition to cancer, which is to start by testing an affected relative when possible. There are several relatives in Ms. Sevilla's family who would be informative relatives for testing, including her daughter and niece (both diagnosed with early-onset breast cancer), her sister (diagnosed with colon cancer), and her maternal cousin (diagnosed with pancreatic cancer.   If these relatives were to pursue genetic testing first, this would avoid the possibility of an uninformative result for Ms. Sevilla, and it is also the best way to provide the most comprehensive information for the family as a whole.   Unfortunately, she does not think that they would be open to testing.  She plans to discuss the option of testing with them, but she would like to move forward with her own genetic  testing.     Ms. Sevilla was counseled about hereditary cancer susceptibility including cancer risks, options for increased screening and/or risk reduction, genetic testing, and the implications for other family members.  We discussed performing testing for high-penetrance breast cancer susceptibility genes, ideally as part of a multi-gene panel.  We specifically discussed the [facility] CancerNext panel, which examines the following 36 genes:  APC, BRADEN, AXIN2, BARD1, BRCA1, BRCA2, BRIP1, BMPR1A, CDH1, CDK4, CDKN2A, CHEK2, DICER1, EPCAM, GREM1, HOXB13, MLH1, MSH2, MSH3, MSH6, MUTYH, NBN, NF1, NTHL1, PALB2, PMS2, POLD1, POLE, PTEN, RAD51C, RAD51D, RECQL, SMAD4, SMARCA4, STK11, TP53.    We discussed the Genetic Information Nondiscrimination Act (SUYAPA), including the protections and limitations. SUYAPA is a federal law that generally makes it illegal for health insurance companies, group health plans, and most employers (with >15 employees) to discriminate based on genetic information. It does not protect against genetic discrimination for life insurance, disability insurance, long-term care, or other insurances.    We reviewed the limitations of testing an unaffected individual for a hereditary cancer syndrome. When testing an individual who has not had a cancer diagnosis, like Ms. Sevilla, a negative test result may have limited utility in defining future cancer risks, as it is unclear whether the affected family members had a mutation that the unaffected individual did not inherit, or alternatively, whether the family history of cancer is due to unidentifiable risk factors that are shared between the unaffected individual and their family members.  She verbalized understanding of these limitations.      After a discussion about the risks, benefits, and limitations of genetic testing,  Ms. Sevilla elected to undergo genetic testing for hereditary cancer via the [facility] panel described above.  She gave verbal consent to proceed with  testing.   Results are typically available within 3-4 weeks, and Ms. Sevilla will return to the Cancer Genetics Clinic via telehealth consult to discuss her testing results.  At that time, we will discuss the implications for both Ms. Sevilla and her family members.      Plan:    - Ms. Sevilla elected to undergo genetic testing via the CancerNext panel + RNAinsight through [facility]. Verbal consent for testing was obtained. An [employer] DNA/RNA blood test kit will be sent to the patient's home. She will then bring the test kit to a  outpatient blood draw lab to have her blood drawn for testing (using the test tubes provided in the kit). The sample will then be sent to [facility] for analysis. Results are typically available in 3-4 weeks.    - A follow up visit will be scheduled for 1/5/24 @ 11:30 am, virtual, to review her genetic test results    - In the meantime, she plans to talk with her relatives about genetic testing, especially those noted above who have a personal history of breast, colon, or pancreatic cancer.     - We remain available to Ms. Sevilla at [phone] if any questions arise regarding information discussed at today's visit.       Patti Ricks, MGC, [facility]  Licensed Genetic Counselor  [facility]  Ph: [phone]    Reviewed by:  Arya Riddle MD  Clinical   [facility]    Time spent with patient: 62 minutes, virtual

## 2023-11-28 NOTE — Clinical Note
Please schedule patient for a cancer genetics follow up visit with me on 1/5/24 @ 11:30 am, virtual.  She is already aware of the apt date and time.

## 2023-12-19 ENCOUNTER — TELEPHONE (OUTPATIENT)
Dept: GENETICS | Facility: CLINIC | Age: 63
End: 2023-12-19

## 2023-12-19 NOTE — TELEPHONE ENCOUNTER
I left a message for the patient regarding their outstanding genetic testing sample ordered by [facility] . I provided my direct line for the patient to confirm if they would like to proceed with testing.

## 2023-12-22 ENCOUNTER — LAB (OUTPATIENT)
Dept: LAB | Facility: LAB | Age: 63
End: 2023-12-22
Payer: COMMERCIAL

## 2023-12-22 ENCOUNTER — OFFICE VISIT (OUTPATIENT)
Dept: PRIMARY CARE | Facility: CLINIC | Age: 63
End: 2023-12-22
Payer: COMMERCIAL

## 2023-12-22 VITALS
TEMPERATURE: 98.1 F | OXYGEN SATURATION: 98 % | WEIGHT: 223.6 LBS | HEIGHT: 66 IN | DIASTOLIC BLOOD PRESSURE: 77 MMHG | HEART RATE: 69 BPM | BODY MASS INDEX: 35.93 KG/M2 | SYSTOLIC BLOOD PRESSURE: 122 MMHG

## 2023-12-22 DIAGNOSIS — Z59.41 FOOD INSECURITY: ICD-10-CM

## 2023-12-22 DIAGNOSIS — Z80.0 FAMILY HISTORY OF COLON CANCER: ICD-10-CM

## 2023-12-22 DIAGNOSIS — J45.909 ASTHMA, UNSPECIFIED ASTHMA SEVERITY, UNSPECIFIED WHETHER COMPLICATED, UNSPECIFIED WHETHER PERSISTENT (HHS-HCC): ICD-10-CM

## 2023-12-22 DIAGNOSIS — T78.40XD ALLERGY, SUBSEQUENT ENCOUNTER: ICD-10-CM

## 2023-12-22 DIAGNOSIS — Z80.0 FAMILY HISTORY OF PANCREATIC CANCER: ICD-10-CM

## 2023-12-22 DIAGNOSIS — I10 BENIGN ESSENTIAL HYPERTENSION: ICD-10-CM

## 2023-12-22 DIAGNOSIS — G47.00 INSOMNIA, UNSPECIFIED TYPE: ICD-10-CM

## 2023-12-22 DIAGNOSIS — I27.20 PULMONARY HYPERTENSION (MULTI): Primary | ICD-10-CM

## 2023-12-22 DIAGNOSIS — E11.9 DIABETES MELLITUS TYPE 2 WITHOUT RETINOPATHY (MULTI): ICD-10-CM

## 2023-12-22 DIAGNOSIS — M10.9 GOUT, UNSPECIFIED CAUSE, UNSPECIFIED CHRONICITY, UNSPECIFIED SITE: ICD-10-CM

## 2023-12-22 DIAGNOSIS — Z80.3 FAMILY HISTORY OF BREAST CANCER: ICD-10-CM

## 2023-12-22 DIAGNOSIS — E78.5 HYPERLIPIDEMIA, UNSPECIFIED HYPERLIPIDEMIA TYPE: ICD-10-CM

## 2023-12-22 LAB
EST. AVERAGE GLUCOSE BLD GHB EST-MCNC: 157 MG/DL
HBA1C MFR BLD: 7.1 %

## 2023-12-22 PROCEDURE — 83036 HEMOGLOBIN GLYCOSYLATED A1C: CPT

## 2023-12-22 PROCEDURE — 1159F MED LIST DOCD IN RCRD: CPT | Performed by: STUDENT IN AN ORGANIZED HEALTH CARE EDUCATION/TRAINING PROGRAM

## 2023-12-22 PROCEDURE — 3074F SYST BP LT 130 MM HG: CPT | Performed by: STUDENT IN AN ORGANIZED HEALTH CARE EDUCATION/TRAINING PROGRAM

## 2023-12-22 PROCEDURE — 3051F HG A1C>EQUAL 7.0%<8.0%: CPT | Performed by: STUDENT IN AN ORGANIZED HEALTH CARE EDUCATION/TRAINING PROGRAM

## 2023-12-22 PROCEDURE — 36415 COLL VENOUS BLD VENIPUNCTURE: CPT

## 2023-12-22 PROCEDURE — 99213 OFFICE O/P EST LOW 20 MIN: CPT | Performed by: STUDENT IN AN ORGANIZED HEALTH CARE EDUCATION/TRAINING PROGRAM

## 2023-12-22 PROCEDURE — 1125F AMNT PAIN NOTED PAIN PRSNT: CPT | Performed by: STUDENT IN AN ORGANIZED HEALTH CARE EDUCATION/TRAINING PROGRAM

## 2023-12-22 PROCEDURE — 1036F TOBACCO NON-USER: CPT | Performed by: STUDENT IN AN ORGANIZED HEALTH CARE EDUCATION/TRAINING PROGRAM

## 2023-12-22 PROCEDURE — 3078F DIAST BP <80 MM HG: CPT | Performed by: STUDENT IN AN ORGANIZED HEALTH CARE EDUCATION/TRAINING PROGRAM

## 2023-12-22 RX ORDER — AMLODIPINE BESYLATE 10 MG/1
10 TABLET ORAL DAILY
Qty: 90 TABLET | Refills: 3 | Status: SHIPPED | OUTPATIENT
Start: 2023-12-22 | End: 2024-04-26 | Stop reason: WASHOUT

## 2023-12-22 RX ORDER — BUDESONIDE AND FORMOTEROL FUMARATE DIHYDRATE 160; 4.5 UG/1; UG/1
AEROSOL RESPIRATORY (INHALATION)
Qty: 10.2 G | Refills: 11 | Status: SHIPPED | OUTPATIENT
Start: 2023-12-22

## 2023-12-22 RX ORDER — MONTELUKAST SODIUM 10 MG/1
10 TABLET ORAL NIGHTLY
Qty: 90 TABLET | Refills: 1 | Status: SHIPPED | OUTPATIENT
Start: 2023-12-22

## 2023-12-22 RX ORDER — ATORVASTATIN CALCIUM 20 MG/1
20 TABLET, FILM COATED ORAL DAILY
Qty: 90 TABLET | Refills: 3 | Status: SHIPPED | OUTPATIENT
Start: 2023-12-22 | End: 2024-04-25 | Stop reason: SDUPTHER

## 2023-12-22 RX ORDER — ALBUTEROL SULFATE 0.83 MG/ML
2.5 SOLUTION RESPIRATORY (INHALATION) EVERY 6 HOURS PRN
Qty: 75 ML | Refills: 11 | Status: SHIPPED | OUTPATIENT
Start: 2023-12-22

## 2023-12-22 RX ORDER — DULAGLUTIDE 0.75 MG/.5ML
0.75 INJECTION, SOLUTION SUBCUTANEOUS
Qty: 4 EACH | Refills: 11 | Status: SHIPPED | OUTPATIENT
Start: 2023-12-22 | End: 2024-04-26 | Stop reason: ALTCHOICE

## 2023-12-22 RX ORDER — COLCHICINE 0.6 MG/1
0.6 TABLET ORAL 2 TIMES DAILY
Qty: 90 TABLET | Refills: 0 | Status: SHIPPED | OUTPATIENT
Start: 2023-12-22

## 2023-12-22 RX ORDER — TRAZODONE HYDROCHLORIDE 100 MG/1
100 TABLET ORAL NIGHTLY
Qty: 90 TABLET | Refills: 3 | Status: SHIPPED | OUTPATIENT
Start: 2023-12-22

## 2023-12-22 RX ORDER — ATENOLOL AND CHLORTHALIDONE TABLET 50; 25 MG/1; MG/1
1 TABLET ORAL DAILY
Qty: 90 TABLET | Refills: 3 | Status: SHIPPED | OUTPATIENT
Start: 2023-12-22

## 2023-12-22 RX ORDER — ALBUTEROL SULFATE 90 UG/1
1-2 AEROSOL, METERED RESPIRATORY (INHALATION) EVERY 6 HOURS PRN
Qty: 18 G | Refills: 11 | Status: SHIPPED | OUTPATIENT
Start: 2023-12-22

## 2023-12-22 RX ORDER — CETIRIZINE HYDROCHLORIDE 10 MG/1
10 TABLET ORAL NIGHTLY
Qty: 90 TABLET | Refills: 0 | Status: SHIPPED | OUTPATIENT
Start: 2023-12-22

## 2023-12-22 RX ORDER — FAMOTIDINE 20 MG/1
20 TABLET, FILM COATED ORAL DAILY PRN
Qty: 90 TABLET | Refills: 3 | Status: SHIPPED | OUTPATIENT
Start: 2023-12-22

## 2023-12-22 SDOH — ECONOMIC STABILITY - FOOD INSECURITY: FOOD INSECURITY: Z59.41

## 2023-12-22 ASSESSMENT — ENCOUNTER SYMPTOMS
OCCASIONAL FEELINGS OF UNSTEADINESS: 1
LOSS OF SENSATION IN FEET: 0
DEPRESSION: 1

## 2023-12-22 ASSESSMENT — PAIN SCALES - GENERAL: PAINLEVEL: 9

## 2023-12-22 NOTE — PROGRESS NOTES
"Subjective   Patient ID: Dot Sevilla is a 63 y.o. female who presents for Med Refill (With Refills) and Follow-up (Sometimes have a hard time getting up., Check Urine, Check A1C).    HPI  Patient presenting for chronic med refills  Endorses compliance with her meds  Would like a repeat A1c check  Denies any chest pain, sob, dizziness  Has run out of her inhalers for asthma  States she doesn't need ur albuterol too often  She tried to schedule an appointment with pulm but they stated that was the wrong doctor for her pulmonary hypertension    Review of Systems  ROS negative except for above mentioned.      Objective   /77 (BP Location: Left arm, Patient Position: Sitting, BP Cuff Size: Large adult)   Pulse 69   Temp 36.7 °C (98.1 °F) (Temporal)   Ht 1.665 m (5' 5.55\")   Wt 101 kg (223 lb 9.6 oz)   SpO2 98%   BMI 36.59 kg/m²     Physical Exam  General: Well developed, well nourished, alert and cooperative, appears to be in no acute distress.   HEENT: Normocephalic, atraumatic.   Cardiac: Normal S1 and S2. No S3, S4, or murmurs.   Lungs: Clear to auscultation bilaterally. No rales, rhonchi, wheezing, diminished breath sounds.   MSK: ROM intact spine and extremities.   Skin: Skin normal color, texture, and turgor with no lesions or eruptions.   Psych: Oriented to person, place, and time. Demonstrates good judgement and reason.     Assessment/Plan   A 63 y o F with a PMH of T2DM, Asthma, HTN, pulm htn, gout presents for medication refills. Patients chronic medications refilled today. No changes in management. Plan for repeat A1C screening for Dm monitoring.     Problem List Items Addressed This Visit       Asthma  Chronic Stable    Relevant Medications    albuterol 2.5 mg /3 mL (0.083 %) nebulizer solution    albuterol 90 mcg/actuation inhaler    montelukast (Singulair) 10 mg tablet    Symbicort 160-4.5 mcg/actuation inhaler    Benign essential hypertension  Chronic, stable     Relevant Medications    " amLODIPine (Norvasc) 10 mg tablet    atenoloL-chlorthalidone (Tenoretic) 50-25 mg tablet        Diabetes mellitus type 2 without retinopathy (CMS/[facility])  Chronic stable     Relevant Medications    dulaglutide (Trulicity) 0.75 mg/0.5 mL pen injector    Other Relevant Orders    Hemoglobin A1c  Lab Results   Component Value Date    HGBA1C 7.4 (A) 08/15/2023        Gout  Chronic stable     Relevant Medications    colchicine 0.6 mg tablet    Insomnia  Chronic, stable     Relevant Medications    traZODone (Desyrel) 100 mg tablet     Other Visit Diagnoses       Pulmonary hypertension (CMS/[facility])    -  Primary    Relevant Orders    Referral to Cardiology    Food insecurity        Hyperlipidemia, unspecified hyperlipidemia type        Relevant Medications    atorvastatin (Lipitor) 20 mg tablet    Allergy, subsequent encounter        Relevant Medications    cetirizine (ZyrTEC) 10 mg tablet          Patient discussed with attending, Dr. Claudia Almaraz MD  Family Medicine, PGY-3

## 2023-12-26 NOTE — PROGRESS NOTES
I saw and evaluated the patient. I personally obtained the key and critical portions of the history and physical exam or was physically present for key and critical portions performed by the resident/fellow. I reviewed the resident/fellow's documentation and discussed the patient with the resident/fellow. I agree with the resident/fellow's medical decision making as documented in the note.    Aaliyah Taylor MD

## 2023-12-29 NOTE — TELEPHONE ENCOUNTER
I attempted to reach the patient today regarding their upcoming genetics appointment. The patient shared with me the call appeared to be a long distance call and disconnected the line.

## 2024-01-02 NOTE — TELEPHONE ENCOUNTER
I received a call back from the patient this afternoon. The patient shared they are no longer interested in genetic testing.

## 2024-01-02 NOTE — TELEPHONE ENCOUNTER
I left a message for the patient today regarding their outstanding genetic testing sample and upcoming visit. I requested the patient return my phone call to further discuss.

## 2024-01-05 ENCOUNTER — APPOINTMENT (OUTPATIENT)
Dept: GENETICS | Facility: HOSPITAL | Age: 64
End: 2024-01-05
Payer: COMMERCIAL

## 2024-01-18 ENCOUNTER — CLINICAL SUPPORT (OUTPATIENT)
Dept: PRIMARY CARE | Facility: CLINIC | Age: 64
End: 2024-01-18
Payer: MEDICARE

## 2024-01-18 VITALS
TEMPERATURE: 98.3 F | OXYGEN SATURATION: 100 % | HEART RATE: 75 BPM | DIASTOLIC BLOOD PRESSURE: 81 MMHG | SYSTOLIC BLOOD PRESSURE: 130 MMHG

## 2024-01-18 PROCEDURE — G0008 ADMIN INFLUENZA VIRUS VAC: HCPCS | Performed by: FAMILY MEDICINE

## 2024-01-18 PROCEDURE — 90662 IIV NO PRSV INCREASED AG IM: CPT | Performed by: FAMILY MEDICINE

## 2024-01-18 ASSESSMENT — PAIN SCALES - GENERAL: PAINLEVEL: 0-NO PAIN

## 2024-01-18 NOTE — PROGRESS NOTES
Pt in clinic to receive flu vaccine. VS WNL. Screening completed and reviewed. Administered to deltoid without event. VIS given to pt.

## 2024-01-26 ENCOUNTER — OFFICE VISIT (OUTPATIENT)
Dept: CARDIOLOGY | Facility: CLINIC | Age: 64
End: 2024-01-26
Payer: MEDICARE

## 2024-01-26 VITALS
BODY MASS INDEX: 36.48 KG/M2 | SYSTOLIC BLOOD PRESSURE: 138 MMHG | WEIGHT: 227 LBS | OXYGEN SATURATION: 97 % | DIASTOLIC BLOOD PRESSURE: 79 MMHG | HEART RATE: 74 BPM | HEIGHT: 66 IN

## 2024-01-26 DIAGNOSIS — I10 BENIGN ESSENTIAL HYPERTENSION: ICD-10-CM

## 2024-01-26 DIAGNOSIS — D50.9 MICROCYTIC ANEMIA: ICD-10-CM

## 2024-01-26 DIAGNOSIS — I27.20 PULMONARY HYPERTENSION (MULTI): ICD-10-CM

## 2024-01-26 DIAGNOSIS — F51.01 PRIMARY INSOMNIA: ICD-10-CM

## 2024-01-26 DIAGNOSIS — R53.83 LETHARGY: ICD-10-CM

## 2024-01-26 DIAGNOSIS — R06.02 SHORTNESS OF BREATH: ICD-10-CM

## 2024-01-26 DIAGNOSIS — R06.09 DYSPNEA ON EXERTION: ICD-10-CM

## 2024-01-26 DIAGNOSIS — R07.89 ATYPICAL CHEST PAIN: Primary | ICD-10-CM

## 2024-01-26 PROCEDURE — 99204 OFFICE O/P NEW MOD 45 MIN: CPT | Performed by: INTERNAL MEDICINE

## 2024-01-26 PROCEDURE — 3078F DIAST BP <80 MM HG: CPT | Performed by: INTERNAL MEDICINE

## 2024-01-26 PROCEDURE — 3048F LDL-C <100 MG/DL: CPT | Performed by: INTERNAL MEDICINE

## 2024-01-26 PROCEDURE — 1126F AMNT PAIN NOTED NONE PRSNT: CPT | Performed by: INTERNAL MEDICINE

## 2024-01-26 PROCEDURE — 1160F RVW MEDS BY RX/DR IN RCRD: CPT | Performed by: INTERNAL MEDICINE

## 2024-01-26 PROCEDURE — 1159F MED LIST DOCD IN RCRD: CPT | Performed by: INTERNAL MEDICINE

## 2024-01-26 PROCEDURE — 3075F SYST BP GE 130 - 139MM HG: CPT | Performed by: INTERNAL MEDICINE

## 2024-01-26 PROCEDURE — 1036F TOBACCO NON-USER: CPT | Performed by: INTERNAL MEDICINE

## 2024-01-26 PROCEDURE — 93005 ELECTROCARDIOGRAM TRACING: CPT | Performed by: INTERNAL MEDICINE

## 2024-01-26 PROCEDURE — 99214 OFFICE O/P EST MOD 30 MIN: CPT | Performed by: INTERNAL MEDICINE

## 2024-01-26 PROCEDURE — 93010 ELECTROCARDIOGRAM REPORT: CPT | Performed by: INTERNAL MEDICINE

## 2024-01-26 ASSESSMENT — ENCOUNTER SYMPTOMS
LOSS OF SENSATION IN FEET: 0
OCCASIONAL FEELINGS OF UNSTEADINESS: 0
DEPRESSION: 0

## 2024-01-26 ASSESSMENT — PAIN SCALES - GENERAL: PAINLEVEL: 0-NO PAIN

## 2024-01-26 NOTE — PROGRESS NOTES
"Referred by Dr. Taylor for New Patient Visit (Referred by Dr Taylor for pulmonary HTN. Patient c/o SOB with exertion)     History Of Present Illness:    Dot Sevilla is a 63 y.o. female has a pertinent medical history notable for Essential hypertension, diabetes mellitus type 2, obesity with BMI greater than 35, gastroesophageal reflux disease, microcytic anemia, depression, chronic gout, history of diabetic neuropathy, is referred to cardiology for establishment of care, discussion of stress test results showing poor exercise tolerance, elevated RVSP with exercise.      She states that she is unsure why she is at this appointment. She does not exercise, nor does she walk much. She has a fear of alling and no way of going to the gym to attend any exercise.  She was apparently referred for stress testing in May 2023 because \"I'm a diabetic\" She reports that she wa't really told why things were going on. She din't schedule SHAHRAM avelaution.     Patient denies chest pain and angina.  Pt denies shortness of breath, dyspnea on exertion, orthopnea, and paroxysmal nocturnal dyspnea.  Pt denies worsening lower extremity edema.  Pt denies palpitations or syncope.  No recent falls.  No fever or chills.  No cough.  No change in bowel or bladder habits.  No travel.  No sick contacts.  No recent travel    12 point review of systems was performed and is otherwise negative.  Past medical history:  As above.  Medications:  Reviewed.  Allergies:  Reviewed.  Social history:  Patient denies smoking, alcohol abuse, or illicit drug use.  Family history:  No sudden cardiac death or premature coronary artery disease.        Past Medical History:  She has a past medical history of Acute pharyngitis, unspecified (03/15/2016), Candidiasis, unspecified (07/31/2020), Contact with and (suspected) exposure to potentially hazardous body fluids (12/02/2015), Dysuria (08/05/2014), Encounter for gynecological examination (general) (routine) without " abnormal findings (07/31/2020), Encounter for gynecological examination (general) (routine) without abnormal findings (07/31/2020), Encounter for immunization (11/18/2014), Encounter for screening mammogram for malignant neoplasm of breast (03/01/2016), Hemorrhage of anus and rectum (06/19/2013), Melena (08/21/2013), Other conditions influencing health status (06/19/2013), Other conditions influencing health status (12/02/2015), Other disturbances of skin sensation (12/02/2015), Other specified soft tissue disorders (09/26/2014), Pain in right foot (05/10/2017), Pain in right foot (05/10/2017), Pain in unspecified knee (03/01/2016), Personal history of diseases of the blood and blood-forming organs and certain disorders involving the immune mechanism, Personal history of diseases of the skin and subcutaneous tissue (09/29/2015), Personal history of nicotine dependence, Personal history of other (healed) physical injury and trauma (06/19/2013), Personal history of other (healed) physical injury and trauma (06/19/2013), Personal history of other diseases of the circulatory system (08/21/2013), Personal history of other diseases of the female genital tract (08/28/2014), Personal history of other diseases of the female genital tract (06/19/2013), Personal history of other diseases of the nervous system and sense organs (06/10/2013), Personal history of other diseases of the nervous system and sense organs, Personal history of other diseases of the respiratory system (03/15/2016), Personal history of other diseases of the respiratory system (03/15/2016), Personal history of other diseases of the respiratory system (06/19/2013), Personal history of other endocrine, nutritional and metabolic disease, Personal history of other infectious and parasitic diseases (08/28/2014), Personal history of other specified conditions (08/05/2014), Personal history of urinary (tract) infections (06/19/2013), Plantar fascial fibromatosis  (04/08/2014), Pruritus, unspecified, Spontaneous ecchymoses (09/08/2014), Sprain of unspecified ligament of left ankle, initial encounter (05/10/2017), Type 2 diabetes mellitus without complications (CMS/[facility]) (05/10/2017), Unspecified cataract (05/05/2017), Unspecified cataract (05/05/2017), Unspecified cataract (05/05/2017), Unspecified cataract (05/05/2017), Unspecified exotropia (05/05/2017), Unspecified exotropia (05/05/2017), Unspecified retinal break, left eye (05/10/2017), and Urinary tract infection, site not specified.    Past Surgical History:  She has a past surgical history that includes Hysterectomy (06/10/2013) and Oophorectomy (06/10/2013).      Social History:  She reports that she has never smoked. She has never used smokeless tobacco. She reports that she does not drink alcohol and does not use drugs.    Family History:  Family History   Problem Relation Name Age of Onset    Other (diabetes mellitus) Mother      Hypertension Mother      Other (stroke syndrome) Mother      Uterine cancer Mother      Other (diabetes mellitus) Father      Heart disease Father      Other (stroke syndrome) Father      Throat cancer Sister      Colon cancer Mother's Sister          Allergies:  Metronidazole    Outpatient Medications:  Current Outpatient Medications   Medication Instructions    albuterol 90 mcg/actuation inhaler 1-2 puffs, inhalation, Every 6 hours PRN    albuterol 2.5 mg, nebulization, Every 6 hours PRN, USE 1 UNIT DOSE IN NEBULIZER EVERY 4 HOURS AS NEEDED.    amLODIPine (NORVASC) 10 mg, oral, Daily, As directed    atenoloL-chlorthalidone (Tenoretic) 50-25 mg tablet 1 tablet, oral, Daily, As directed    atorvastatin (LIPITOR) 20 mg, oral, Daily    cetirizine (ZYRTEC) 10 mg, oral, Nightly    colchicine 0.6 mg, oral, 2 times daily, TAKE 1 TABLET 3 times daily PRN Gout flare up    dextromethorphan-guaifenesin (Robitussin DM)  mg/5 mL syrup TAKE 10 ML EVERY 4-6 HOURS AS NEEDED    famotidine (PEPCID) 20  "mg, oral, Daily PRN, hearatburn    flash glucose sensor (FREESTYLE NITA 2 SENSOR MIS) Test every day    fluticasone (Flonase) 50 mcg/actuation nasal spray 1-2 sprays, Each Nostril, Daily    FreeStyle Lite Strips strip 1 strip, subcutaneous (via wearable injector), 4 times daily before meals and nightly, TEST DAILY AND AS NEEDED AS DIRECTED    ibuprofen 800 mg, oral, 3 times daily PRN, Knee pain    montelukast (SINGULAIR) 10 mg, oral, Nightly    Symbicort 160-4.5 mcg/actuation inhaler INHALE 2 PUFFS IN THE MORNING AND 2 PUFFS IN THE EVENING. RINSE MOUTH AFTER USE.    traZODone (DESYREL) 100 mg, oral, Nightly    Trulicity 0.75 mg, subcutaneous, Weekly        Last Recorded Vitals:  Vitals:    01/26/24 1056   BP: 138/79   Patient Position: Sitting   Pulse: 74   SpO2: 97%   Weight: 103 kg (227 lb)   Height: 1.676 m (5' 6\")       Physical Exam:  Vitals and nursing note reviewed.   Constitutional:       Appearance: Healthy appearance. Not in distress.   Eyes:      Pupils: Pupils are equal, round, and reactive to light.   Neck:      Vascular: JVD normal.      Lymphadenopathy: No cervical adenopathy.   Pulmonary:      Effort: Pulmonary effort is normal.      Breath sounds: Normal breath sounds. No wheezing. No rhonchi. No rales.   Chest:      Chest wall: Not tender to palpatation.   Cardiovascular:      Normal rate. Regular rhythm. Normal S1. Normal S2.       Murmurs: There is no murmur.      No gallop.  No rub.   Pulses:     Intact distal pulses.   Edema:     Peripheral edema absent.   Abdominal:      General: Bowel sounds are normal.      Palpations: Abdomen is soft.   Musculoskeletal: Normal range of motion.      Cervical back: Normal range of motion. Skin:     General: Skin is warm and dry.   Neurological:      General: No focal deficit present.      Mental Status: Alert and oriented to person, place and time.       Last Labs:  CBC -  Lab Results   Component Value Date    WBC 10.2 01/01/2020    HGB 13.1 01/01/2020    " HCT 40.3 01/01/2020    MCV 75 (L) 01/01/2020     01/01/2020       CMP -  Lab Results   Component Value Date    CALCIUM 9.5 01/24/2023    PHOS 5.2 (H) 10/04/2021    PROT 7.8 01/24/2023    ALBUMIN 4.0 01/24/2023    AST 16 01/24/2023    ALT 22 01/24/2023    ALKPHOS 105 01/24/2023    BILITOT 0.3 01/24/2023       LIPID PANEL -   Lab Results   Component Value Date    CHOL 201 (H) 01/24/2023    TRIG 100 01/24/2023    HDL 46.5 01/24/2023    CHHDL 4.3 01/24/2023    LDLF 135 (H) 01/24/2023    VLDL 20 01/24/2023       RENAL FUNCTION PANEL -   Lab Results   Component Value Date    GLUCOSE 123 (H) 01/24/2023     01/24/2023    K 3.7 01/24/2023    CL 98 01/24/2023    CO2 32 01/24/2023    ANIONGAP 13 01/24/2023    BUN 34 (H) 01/24/2023    CREATININE 1.32 (H) 01/24/2023    CALCIUM 9.5 01/24/2023    PHOS 5.2 (H) 10/04/2021    ALBUMIN 4.0 01/24/2023        Lab Results   Component Value Date    HGBA1C 7.1 (H) 12/22/2023       Last Cardiology Tests:  ECG:  IN Office EKG 1/26/2024 -- Sinus Rhythm    Echo:   1. The left ventricular systolic function is normal with a 60-65% estimated ejection fraction.   2. Spectral Doppler shows an impaired relaxation pattern of left ventricular diastolic filling.   3. Slightly elevated RVSP.    Stress Test:  05/2023 Exercisde Stress Echo  Baseline Echo: Global LV systolic function is normal. The resting baseline ejection fraction was estimated at 55 to 60%. There are no regional wall motion abnormalities at baseline.     Stress Echo: There are no stress induced regional wall motion abnormalities. The ejection fraction is approximately 65 to 70% at peak stress. The left ventricular internal cavity size at peak stress is decreased. At peak stress, the global LV systolic function is increased.     Mitral Valve:  Resting  MV E Vmax:  0.59 m/s  MV A Vmax:  0.60 m/s  MV E/A:     0.99  Lateral e': 0.09 m/s  Medial e':  0.07 m/s  MV DT:      209 msec           Tricuspid Valve:  Resting  TR Vmax:       3.13 m/s  TR Peak Grad: 22.4 mmHg  RVSP:         27.4 mmHg     Summary:  1. The resting ejection fraction was estimated at 55 to 60% with a peak exercise ejection fraction estimated at 65 to 70%.  2. There is no evidence of inducible ischemia by stress echocardiography.  3. There is poor functional capacity with decreased exercise tolerance and evidence of exercise-induced pulmonary hypertension. Estimated RVSP increased from 14 mmHg at rest to 39 mmHg during peak stress.  4. No clinical, echocardiographic or electrocardiographic evidence for ischemia at a maximal workload.  5. The adequate level of stress was achieved.    Cardiac Imaging:  No results found for this or any previous visit from the past 1095 days.      Lab review: I have personally reviewed the laboratory result(s)   Diagnostic review: I have personally reviewed the result(s) of the EKG and Echocardiogram .     Assessment/Plan     Problem List Items Addressed This Visit       Atypical chest pain - Primary    Relevant Orders    ECG 12 lead (Clinic Performed) (Completed)    CT cardiac scoring wo IV contrast    B-Type Natriuretic Peptide    Troponin I, High Sensitivity (Completed)    CBC and Auto Differential (Completed)    Iron and TIBC (Completed)    Transthoracic echo (TTE) complete    Lipid Panel (Completed)    Benign essential hypertension    Relevant Orders    B-Type Natriuretic Peptide    Troponin I, High Sensitivity (Completed)    Comprehensive metabolic panel (Completed)    CBC and Auto Differential (Completed)    Transthoracic echo (TTE) complete    Lipid Panel (Completed)    Dyspnea on exertion    Insomnia    Relevant Orders    Referral to Adult Sleep Medicine    Comprehensive metabolic panel (Completed)    CBC and Auto Differential (Completed)    Lethargy    Relevant Orders    Referral to Adult Sleep Medicine    TSH (Completed)    Comprehensive metabolic panel (Completed)    CBC and Auto Differential (Completed)    Iron and TIBC (Completed)     Microcytic anemia    Relevant Orders    Comprehensive metabolic panel (Completed)    CBC and Auto Differential (Completed)    Iron and TIBC (Completed)     Other Visit Diagnoses       Pulmonary hypertension (CMS/HCC)        Relevant Orders    ECG 12 lead (Clinic Performed) (Completed)    Referral to Adult Sleep Medicine    B-Type Natriuretic Peptide    Troponin I, High Sensitivity (Completed)    Comprehensive metabolic panel (Completed)    CBC and Auto Differential (Completed)    Transthoracic echo (TTE) complete    Shortness of breath        Relevant Orders    B-Type Natriuretic Peptide                Marshall Hoang, DO

## 2024-01-26 NOTE — PATIENT INSTRUCTIONS
"It was a pleasure seeing you today. I would like to see you back in clinic about 1-2 weeks after the completion of your studies. You can call my office if questions arise between now and our next visit.     Today, we talked about a lot of things     1) Your exersie test showed you really are out of shape.   -- You will have a repeat echocardiogram to re-assess the pressures in your heart    -- You will schedule a CT [place] Study to help look at the risk of heart diseae     -- Please have blood work done to look at the Blood Counts, Cholesterol levels and Kidney Fuinction     -- Please SCHEDULE A SLEEP MEDICINE APPOINTMENT     -- PLEASE LOOK AT ONLINE COUNSELING TO HELP WITH YOUR GRIEF    Below are some Heart Health Tips that we provide to all of our patients. I hope you fing them useful.     - If you are having problems with medications, consider looking at the following websites.   --  \"GoodRx\"   --  \"[facility]\"      - We are happy to supply written prescriptions if needed to allow you to obtain your medications from different pharmacies. Additionally, if you are having issues with mail order delivery, please let us know. We can send a limited supply of your medications to your local pharmacy.     -  We recommend you follow a heart healthy diet. Watch food labels and try not to eat more than 2,500 mg of sodium per day. Avoid foods high in salt like processed meats (lunch meats, sher, and sausage), processed foods (boxed dinners, canned soups), fried and fast foods. Monitor serving sizes and if the sodium per serving size is more than 200 mg, avoid those foods. If the sodium per serving size is between 100-200 mg, you can use those in limited quantities. Try to choose foods where the amount of sodium per serving size is less than 100 mg. Try to eat a diet rich in fruits and vegetables, whole grains, low fat dairy products, skinless poultry and fish, nuts, beans, non-tropical vegetable oils. " Limit saturated fat, trans fat, sodium, red meats, and sugar-sweetened beverages.   Limit alcohol     -The combination of a reduced-calorie diet and increased physical activity is recommended. Adults should aim to get at least 150 minutes of moderate physical activity per week (30 minutes of moderate physical activities at least 5 days per week). Examples of moderate physical activities include brisk walking, swimming, aerobic dancing, heavy gardening, jumping rope, bicycling 10 MPH or faster, tennis, hiking uphill or with a heavy backpack. Please let us know if you would like to learn more about your nutrition and calories and additional options including weight loss programs to help you reach your goal.     -If you smoke, stop smoking. If you stop smoking you can help get rid of a major source of stress to your heart. Smoking makes your heart rate and blood pressure go up and increases your risk or developing cardiovascular diseases and worsen symptoms associated with heart failure.     -Obtain a BP monitor and monitor your BP daily. Check it around the same time each day; at least 1 hour after taking your medications. Record your BP in a log and bring your log with you to your doctors appointment.     -F/u with your PCP as recommended.

## 2024-01-29 ENCOUNTER — LAB (OUTPATIENT)
Dept: LAB | Facility: LAB | Age: 64
End: 2024-01-29
Payer: MEDICARE

## 2024-01-29 DIAGNOSIS — D50.9 MICROCYTIC ANEMIA: ICD-10-CM

## 2024-01-29 DIAGNOSIS — I10 BENIGN ESSENTIAL HYPERTENSION: ICD-10-CM

## 2024-01-29 DIAGNOSIS — F51.01 PRIMARY INSOMNIA: ICD-10-CM

## 2024-01-29 DIAGNOSIS — R06.02 SHORTNESS OF BREATH: ICD-10-CM

## 2024-01-29 DIAGNOSIS — I27.20 PULMONARY HYPERTENSION (MULTI): ICD-10-CM

## 2024-01-29 DIAGNOSIS — R07.89 ATYPICAL CHEST PAIN: ICD-10-CM

## 2024-01-29 DIAGNOSIS — R53.83 LETHARGY: ICD-10-CM

## 2024-01-29 LAB
ALBUMIN SERPL BCP-MCNC: 3.7 G/DL (ref 3.4–5)
ALP SERPL-CCNC: 103 U/L (ref 33–136)
ALT SERPL W P-5'-P-CCNC: 17 U/L (ref 7–45)
ANION GAP SERPL CALC-SCNC: 13 MMOL/L (ref 10–20)
AST SERPL W P-5'-P-CCNC: 17 U/L (ref 9–39)
ATRIAL RATE: 74 BPM
BASOPHILS # BLD AUTO: 0.08 X10*3/UL (ref 0–0.1)
BASOPHILS NFR BLD AUTO: 0.9 %
BILIRUB SERPL-MCNC: 0.4 MG/DL (ref 0–1.2)
BNP SERPL-MCNC: 23 PG/ML (ref 0–99)
BUN SERPL-MCNC: 27 MG/DL (ref 6–23)
CALCIUM SERPL-MCNC: 9.2 MG/DL (ref 8.6–10.6)
CARDIAC TROPONIN I PNL SERPL HS: 5 NG/L (ref 0–34)
CHLORIDE SERPL-SCNC: 100 MMOL/L (ref 98–107)
CHOLEST SERPL-MCNC: 131 MG/DL (ref 0–199)
CHOLESTEROL/HDL RATIO: 3
CO2 SERPL-SCNC: 30 MMOL/L (ref 21–32)
CREAT SERPL-MCNC: 1.16 MG/DL (ref 0.5–1.05)
EGFRCR SERPLBLD CKD-EPI 2021: 52 ML/MIN/1.73M*2
EOSINOPHIL # BLD AUTO: 0.27 X10*3/UL (ref 0–0.7)
EOSINOPHIL NFR BLD AUTO: 2.9 %
ERYTHROCYTE [DISTWIDTH] IN BLOOD BY AUTOMATED COUNT: 15.5 % (ref 11.5–14.5)
GLUCOSE SERPL-MCNC: 148 MG/DL (ref 74–99)
HCT VFR BLD AUTO: 38.5 % (ref 36–46)
HDLC SERPL-MCNC: 43.5 MG/DL
HGB BLD-MCNC: 12.2 G/DL (ref 12–16)
IMM GRANULOCYTES # BLD AUTO: 0.03 X10*3/UL (ref 0–0.7)
IMM GRANULOCYTES NFR BLD AUTO: 0.3 % (ref 0–0.9)
IRON SATN MFR SERPL: 21 % (ref 25–45)
IRON SERPL-MCNC: 68 UG/DL (ref 35–150)
LDLC SERPL CALC-MCNC: 74 MG/DL
LYMPHOCYTES # BLD AUTO: 3.14 X10*3/UL (ref 1.2–4.8)
LYMPHOCYTES NFR BLD AUTO: 33.9 %
MCH RBC QN AUTO: 24.6 PG (ref 26–34)
MCHC RBC AUTO-ENTMCNC: 31.7 G/DL (ref 32–36)
MCV RBC AUTO: 78 FL (ref 80–100)
MONOCYTES # BLD AUTO: 0.73 X10*3/UL (ref 0.1–1)
MONOCYTES NFR BLD AUTO: 7.9 %
NEUTROPHILS # BLD AUTO: 5.02 X10*3/UL (ref 1.2–7.7)
NEUTROPHILS NFR BLD AUTO: 54.1 %
NON HDL CHOLESTEROL: 88 MG/DL (ref 0–149)
NRBC BLD-RTO: 0 /100 WBCS (ref 0–0)
P AXIS: 14 DEGREES
P OFFSET: 178 MS
P ONSET: 125 MS
PLATELET # BLD AUTO: 361 X10*3/UL (ref 150–450)
POTASSIUM SERPL-SCNC: 3.4 MMOL/L (ref 3.5–5.3)
PR INTERVAL: 204 MS
PROT SERPL-MCNC: 7.4 G/DL (ref 6.4–8.2)
Q ONSET: 227 MS
QRS COUNT: 12 BEATS
QRS DURATION: 84 MS
QT INTERVAL: 376 MS
QTC CALCULATION(BAZETT): 417 MS
QTC FREDERICIA: 403 MS
R AXIS: 11 DEGREES
RBC # BLD AUTO: 4.95 X10*6/UL (ref 4–5.2)
SODIUM SERPL-SCNC: 140 MMOL/L (ref 136–145)
T AXIS: 20 DEGREES
T OFFSET: 415 MS
TIBC SERPL-MCNC: 325 UG/DL (ref 240–445)
TRIGL SERPL-MCNC: 69 MG/DL (ref 0–149)
TSH SERPL-ACNC: 2.31 MIU/L (ref 0.44–3.98)
UIBC SERPL-MCNC: 257 UG/DL (ref 110–370)
VENTRICULAR RATE: 74 BPM
VLDL: 14 MG/DL (ref 0–40)
WBC # BLD AUTO: 9.3 X10*3/UL (ref 4.4–11.3)

## 2024-01-29 PROCEDURE — 83880 ASSAY OF NATRIURETIC PEPTIDE: CPT

## 2024-01-29 PROCEDURE — 83550 IRON BINDING TEST: CPT

## 2024-01-29 PROCEDURE — 84484 ASSAY OF TROPONIN QUANT: CPT

## 2024-01-29 PROCEDURE — 80061 LIPID PANEL: CPT

## 2024-01-29 PROCEDURE — 85025 COMPLETE CBC W/AUTO DIFF WBC: CPT

## 2024-01-29 PROCEDURE — 84443 ASSAY THYROID STIM HORMONE: CPT

## 2024-01-29 PROCEDURE — 80053 COMPREHEN METABOLIC PANEL: CPT

## 2024-01-29 PROCEDURE — 36415 COLL VENOUS BLD VENIPUNCTURE: CPT

## 2024-01-29 PROCEDURE — 83540 ASSAY OF IRON: CPT

## 2024-02-01 ENCOUNTER — TELEPHONE (OUTPATIENT)
Dept: PRIMARY CARE | Facility: CLINIC | Age: 64
End: 2024-02-01
Payer: MEDICARE

## 2024-02-09 ENCOUNTER — HOSPITAL ENCOUNTER (OUTPATIENT)
Dept: CARDIOLOGY | Facility: HOSPITAL | Age: 64
Discharge: HOME | End: 2024-02-09
Payer: COMMERCIAL

## 2024-02-09 DIAGNOSIS — I10 BENIGN ESSENTIAL HYPERTENSION: ICD-10-CM

## 2024-02-09 DIAGNOSIS — R06.02 SHORTNESS OF BREATH: ICD-10-CM

## 2024-02-09 DIAGNOSIS — R07.89 ATYPICAL CHEST PAIN: ICD-10-CM

## 2024-02-09 DIAGNOSIS — I27.20 PULMONARY HYPERTENSION (MULTI): ICD-10-CM

## 2024-02-09 LAB
AORTIC VALVE MEAN GRADIENT: 3 MMHG
AORTIC VALVE PEAK VELOCITY: 1.11 M/S
AV PEAK GRADIENT: 4.9 MMHG
AVA (PEAK VEL): 2.8 CM2
AVA (VTI): 2.63 CM2
EJECTION FRACTION APICAL 4 CHAMBER: 61.6
EJECTION FRACTION: 58 %
LEFT ATRIUM VOLUME AREA LENGTH INDEX BSA: 17.9 ML/M2
LEFT VENTRICLE INTERNAL DIMENSION DIASTOLE: 3.2 CM (ref 3.5–6)
LEFT VENTRICULAR OUTFLOW TRACT DIAMETER: 2.09 CM
MITRAL VALVE E/A RATIO: 0.7
MITRAL VALVE E/E' RATIO: 9.34
RIGHT VENTRICLE FREE WALL PEAK S': 11 CM/S
RIGHT VENTRICLE PEAK SYSTOLIC PRESSURE: 31.8 MMHG
TRICUSPID ANNULAR PLANE SYSTOLIC EXCURSION: 2.2 CM

## 2024-02-09 PROCEDURE — 93306 TTE W/DOPPLER COMPLETE: CPT

## 2024-02-09 PROCEDURE — 93306 TTE W/DOPPLER COMPLETE: CPT | Performed by: INTERNAL MEDICINE

## 2024-02-16 ENCOUNTER — APPOINTMENT (OUTPATIENT)
Dept: NUTRITION | Facility: HOSPITAL | Age: 64
End: 2024-02-16
Payer: MEDICARE

## 2024-03-14 ENCOUNTER — APPOINTMENT (OUTPATIENT)
Dept: NUTRITION | Facility: HOSPITAL | Age: 64
End: 2024-03-14
Payer: COMMERCIAL

## 2024-03-21 ENCOUNTER — TELEPHONE (OUTPATIENT)
Dept: PRIMARY CARE | Facility: CLINIC | Age: 64
End: 2024-03-21

## 2024-04-03 ENCOUNTER — HOSPITAL ENCOUNTER (INPATIENT)
Dept: RADIOLOGY | Facility: HOSPITAL | Age: 64
Discharge: HOME | End: 2024-04-03

## 2024-04-03 DIAGNOSIS — R07.89 ATYPICAL CHEST PAIN: ICD-10-CM

## 2024-04-03 PROCEDURE — 75571 CT HRT W/O DYE W/CA TEST: CPT

## 2024-04-12 ENCOUNTER — APPOINTMENT (OUTPATIENT)
Dept: PRIMARY CARE | Facility: CLINIC | Age: 64
End: 2024-04-12
Payer: MEDICAID

## 2024-04-23 ENCOUNTER — LAB (OUTPATIENT)
Dept: LAB | Facility: LAB | Age: 64
End: 2024-04-23
Payer: COMMERCIAL

## 2024-04-23 ENCOUNTER — OFFICE VISIT (OUTPATIENT)
Dept: PRIMARY CARE | Facility: CLINIC | Age: 64
End: 2024-04-23
Payer: COMMERCIAL

## 2024-04-23 ENCOUNTER — DOCUMENTATION (OUTPATIENT)
Dept: HOME HEALTH SERVICES | Facility: HOME HEALTH | Age: 64
End: 2024-04-23

## 2024-04-23 ENCOUNTER — HOME HEALTH ADMISSION (OUTPATIENT)
Dept: HOME HEALTH SERVICES | Facility: HOME HEALTH | Age: 64
End: 2024-04-23
Payer: COMMERCIAL

## 2024-04-23 VITALS
OXYGEN SATURATION: 96 % | BODY MASS INDEX: 36.96 KG/M2 | TEMPERATURE: 97.5 F | HEART RATE: 74 BPM | WEIGHT: 230 LBS | HEIGHT: 66 IN | DIASTOLIC BLOOD PRESSURE: 71 MMHG | SYSTOLIC BLOOD PRESSURE: 135 MMHG

## 2024-04-23 DIAGNOSIS — M54.10 RADICULOPATHY OF LEG: ICD-10-CM

## 2024-04-23 DIAGNOSIS — E11.42 DIABETIC POLYNEUROPATHY ASSOCIATED WITH TYPE 2 DIABETES MELLITUS (MULTI): ICD-10-CM

## 2024-04-23 DIAGNOSIS — M10.9 GOUT, UNSPECIFIED CAUSE, UNSPECIFIED CHRONICITY, UNSPECIFIED SITE: ICD-10-CM

## 2024-04-23 DIAGNOSIS — I10 BENIGN ESSENTIAL HYPERTENSION: ICD-10-CM

## 2024-04-23 DIAGNOSIS — M17.12 OSTEOARTHRITIS OF LEFT KNEE, UNSPECIFIED OSTEOARTHRITIS TYPE: ICD-10-CM

## 2024-04-23 DIAGNOSIS — E08.22 DIABETES MELLITUS DUE TO UNDERLYING CONDITION WITH CHRONIC KIDNEY DISEASE, WITHOUT LONG-TERM CURRENT USE OF INSULIN, UNSPECIFIED CKD STAGE (MULTI): ICD-10-CM

## 2024-04-23 DIAGNOSIS — H50.111 EXOTROPIA, RIGHT EYE: ICD-10-CM

## 2024-04-23 DIAGNOSIS — I10 BENIGN ESSENTIAL HYPERTENSION: Primary | ICD-10-CM

## 2024-04-23 LAB
ALBUMIN SERPL BCP-MCNC: 4 G/DL (ref 3.4–5)
ANION GAP SERPL CALC-SCNC: 14 MMOL/L (ref 10–20)
BUN SERPL-MCNC: 25 MG/DL (ref 6–23)
CALCIUM SERPL-MCNC: 9.5 MG/DL (ref 8.6–10.6)
CHLORIDE SERPL-SCNC: 98 MMOL/L (ref 98–107)
CO2 SERPL-SCNC: 33 MMOL/L (ref 21–32)
CREAT SERPL-MCNC: 1.03 MG/DL (ref 0.5–1.05)
EGFRCR SERPLBLD CKD-EPI 2021: 60 ML/MIN/1.73M*2
EST. AVERAGE GLUCOSE BLD GHB EST-MCNC: 186 MG/DL
GLUCOSE SERPL-MCNC: 150 MG/DL (ref 74–99)
HBA1C MFR BLD: 8.1 %
PHOSPHATE SERPL-MCNC: 3.8 MG/DL (ref 2.5–4.9)
POTASSIUM SERPL-SCNC: 3.8 MMOL/L (ref 3.5–5.3)
SODIUM SERPL-SCNC: 141 MMOL/L (ref 136–145)

## 2024-04-23 PROCEDURE — 83036 HEMOGLOBIN GLYCOSYLATED A1C: CPT

## 2024-04-23 PROCEDURE — 4010F ACE/ARB THERAPY RXD/TAKEN: CPT | Performed by: STUDENT IN AN ORGANIZED HEALTH CARE EDUCATION/TRAINING PROGRAM

## 2024-04-23 PROCEDURE — 3075F SYST BP GE 130 - 139MM HG: CPT | Performed by: STUDENT IN AN ORGANIZED HEALTH CARE EDUCATION/TRAINING PROGRAM

## 2024-04-23 PROCEDURE — 3052F HG A1C>EQUAL 8.0%<EQUAL 9.0%: CPT | Performed by: STUDENT IN AN ORGANIZED HEALTH CARE EDUCATION/TRAINING PROGRAM

## 2024-04-23 PROCEDURE — 99213 OFFICE O/P EST LOW 20 MIN: CPT | Performed by: STUDENT IN AN ORGANIZED HEALTH CARE EDUCATION/TRAINING PROGRAM

## 2024-04-23 PROCEDURE — 3048F LDL-C <100 MG/DL: CPT | Performed by: STUDENT IN AN ORGANIZED HEALTH CARE EDUCATION/TRAINING PROGRAM

## 2024-04-23 PROCEDURE — G2212 PROLONG OUTPT/OFFICE VIS: HCPCS | Performed by: STUDENT IN AN ORGANIZED HEALTH CARE EDUCATION/TRAINING PROGRAM

## 2024-04-23 PROCEDURE — 1036F TOBACCO NON-USER: CPT | Performed by: STUDENT IN AN ORGANIZED HEALTH CARE EDUCATION/TRAINING PROGRAM

## 2024-04-23 PROCEDURE — 1125F AMNT PAIN NOTED PAIN PRSNT: CPT | Performed by: STUDENT IN AN ORGANIZED HEALTH CARE EDUCATION/TRAINING PROGRAM

## 2024-04-23 PROCEDURE — 1159F MED LIST DOCD IN RCRD: CPT | Performed by: STUDENT IN AN ORGANIZED HEALTH CARE EDUCATION/TRAINING PROGRAM

## 2024-04-23 PROCEDURE — 3078F DIAST BP <80 MM HG: CPT | Performed by: STUDENT IN AN ORGANIZED HEALTH CARE EDUCATION/TRAINING PROGRAM

## 2024-04-23 PROCEDURE — 80069 RENAL FUNCTION PANEL: CPT

## 2024-04-23 PROCEDURE — 1160F RVW MEDS BY RX/DR IN RCRD: CPT | Performed by: STUDENT IN AN ORGANIZED HEALTH CARE EDUCATION/TRAINING PROGRAM

## 2024-04-23 PROCEDURE — 36415 COLL VENOUS BLD VENIPUNCTURE: CPT

## 2024-04-23 RX ORDER — ACETAMINOPHEN 500 MG
1000 TABLET ORAL EVERY 6 HOURS PRN
Qty: 90 TABLET | Refills: 3 | Status: SHIPPED | OUTPATIENT
Start: 2024-04-23

## 2024-04-23 RX ORDER — LIDOCAINE 40 MG/G
CREAM TOPICAL 4 TIMES DAILY PRN
Qty: 28 G | Refills: 3 | Status: SHIPPED | OUTPATIENT
Start: 2024-04-23 | End: 2025-04-23

## 2024-04-23 ASSESSMENT — PAIN SCALES - GENERAL: PAINLEVEL: 10-WORST PAIN EVER

## 2024-04-23 NOTE — PROGRESS NOTES
"Subjective   Patient ID: Dot Sevilla is a 63 y.o. female who presents for Follow-up.  HPI    #ed visit  Went to the ED for left knee pain   Was prescribed steroids for inflammation and pain: she was prescribed a 5 day course of steroids and it didn't help her at all   Negative DVT study. Negative xray with mild degenerative changes   She needs a home health aid- because she has a hx of falls   She last fell in feb outside and then a week later she hit her knee   She has issue with her knee, pain in left knee   She has had issues with her knee for a long time   She has been using ibuprofen but it doesn't help her with her pain  Also has neuropathy but stopped taking gabapentin because it made her sick     #pulm htn  Recently established care with cardiology    #dm  Currently on trulicity  Sees podiatry next month  Has to schedule an eye appointment     Review of Systems  ROS negative except for above mentioned.      Objective   /71 (BP Location: Right arm, Patient Position: Sitting, BP Cuff Size: Adult)   Pulse 74   Temp 36.4 °C (97.5 °F)   Ht 1.676 m (5' 6\")   Wt 104 kg (230 lb)   SpO2 96%   BMI 37.12 kg/m²     Physical Exam  General: Well developed, well nourished, alert and cooperative, appears to be in no acute distress.   HEENT: Normocephalic, atraumatic.   Cardiac: Regular rate  Lungs: No increase work of breathing noted   MSK: Patient using crutch to ambulate. Limited ROM in left knee due pain. No tenderness on palpation of knee.   Neuro: CN II-XII grossly intact.   Psych: Oriented to person, place, and time.     Assessment/Plan   A 63 y o F with a PMH of T2DM c/b neuropathy, Asthma, seasonal allergies, HTN, pulm htn, gout, depression, GERD, presents for a post ed visit follow up.     Problem List Items Addressed This Visit       Benign essential hypertension - Primary  Chronic, at goal     Relevant Orders    Renal function panel      Osteoarthritis of left knee, unspecified osteoarthritis type   "      Relevant Medications    Start lidocaine (LMX 4) 4 % cream prn    Start acetaminophen (Tylenol Extra Strength) 500 mg tablet prn     Other Relevant Orders    Referral to Physical Therapy    Referral to Home Care  Patient is falls risk and needs home health aid for ambulation assistance. She uses an assistive device to walk.     Diabetes mellitus due to underlying condition with chronic kidney disease, without long-term current use of insulin, unspecified CKD stage (Multi)        Relevant Orders    Hemoglobin A1c    Referral to Home Care          RTC in 1 month for a fu on knee pain    Patient discussed with attending, Dr. Clifford Almaraz MD  Family Medicine, PGY-3

## 2024-04-23 NOTE — HH CARE COORDINATION
Home Care received a Referral for Physical Therapy and Home Health Aide. We have processed the referral for a Start of Care on 4/23-4/24.     If you have any questions or concerns, please feel free to contact us at [phone]. Follow the prompts, enter your five digit zip code, and you will be directed to your care team on [facility].

## 2024-04-24 ENCOUNTER — HOME CARE VISIT (OUTPATIENT)
Dept: HOME HEALTH SERVICES | Facility: HOME HEALTH | Age: 64
End: 2024-04-24
Payer: COMMERCIAL

## 2024-04-24 VITALS — SYSTOLIC BLOOD PRESSURE: 140 MMHG | DIASTOLIC BLOOD PRESSURE: 75 MMHG | HEART RATE: 78 BPM

## 2024-04-24 PROCEDURE — 0023 HH SOC

## 2024-04-24 PROCEDURE — G0151 HHCP-SERV OF PT,EA 15 MIN: HCPCS

## 2024-04-24 SDOH — HEALTH STABILITY: PHYSICAL HEALTH: EXERCISE ACTIVITY: STANDING HEP

## 2024-04-24 SDOH — HEALTH STABILITY: PHYSICAL HEALTH: EXERCISE TYPE: KNEE PROTOCOL ACTIVITIES

## 2024-04-24 SDOH — HEALTH STABILITY: PHYSICAL HEALTH: PHYSICAL EXERCISE: 10

## 2024-04-24 SDOH — HEALTH STABILITY: PHYSICAL HEALTH
EXERCISE COMMENTS: STANDING HEP INCLUDES: CALF RAISES, KNEE FLEXION, HIP FLEXION, MARCHING  SEATED HEP INCLUDES ANKLE PUMPS, LAQS, HIP FLEXION, AND HEELSLIDES

## 2024-04-24 SDOH — HEALTH STABILITY: PHYSICAL HEALTH: EXERCISE ACTIVITIES SETS: 1

## 2024-04-24 SDOH — HEALTH STABILITY: PHYSICAL HEALTH: EXERCISE ACTIVITY: SEATED HEP

## 2024-04-24 SDOH — HEALTH STABILITY: PHYSICAL HEALTH: PHYSICAL EXERCISE: STANDING

## 2024-04-24 SDOH — ECONOMIC STABILITY: HOUSING INSECURITY: OPEN FLAME PRESENT: 1

## 2024-04-24 SDOH — ECONOMIC STABILITY: HOUSING INSECURITY
HOME SAFETY: THERAPIST DISCUSSED WITH PATIENT AND CAREGIVER IMPORTANCE OF HOME SAFETY, SPECIFICALLY FOR CLUTTER FREE WALKING PATHWAYS AND ADEUQATE LIGHTING

## 2024-04-24 SDOH — HEALTH STABILITY: PHYSICAL HEALTH: PHYSICAL EXERCISE: SITTING

## 2024-04-24 ASSESSMENT — ENCOUNTER SYMPTOMS
PAIN LOCATION - PAIN SEVERITY: 10/10
DEPRESSION: 0
OCCASIONAL FEELINGS OF UNSTEADINESS: 1
PAIN LOCATION - PAIN FREQUENCY: FREQUENT
PAIN: 1
PAIN LOCATION: LEFT KNEE
ARTHRALGIAS: 1
PERSON REPORTING PAIN: PATIENT
MUSCLE WEAKNESS: 1
PAIN SEVERITY GOAL: 4/10
LOSS OF SENSATION IN FEET: 1
HIGHEST PAIN SEVERITY IN PAST 24 HOURS: 10/10
PAIN LOCATION - RELIEVING FACTORS: NOTHING
LOWEST PAIN SEVERITY IN PAST 24 HOURS: 7/10
SUBJECTIVE PAIN PROGRESSION: WAXING AND WANING
LIMITED RANGE OF MOTION: 1
HYPERTENSION: 1

## 2024-04-24 ASSESSMENT — ACTIVITIES OF DAILY LIVING (ADL)
CURRENT_FUNCTION: SUPERVISION
AMBULATION ASSISTANCE: 1
AMBULATION ASSISTANCE: STAND BY ASSIST
ENTERING_EXITING_HOME: SUPERVISION
AMBULATION ASSISTANCE: ONE PERSON
PHYSICAL TRANSFERS ASSESSED: 1
OASIS_M1830: 05
AMBULATION ASSISTANCE ON FLAT SURFACES: 1
CURRENT_FUNCTION: ONE PERSON

## 2024-04-25 ENCOUNTER — OFFICE VISIT (OUTPATIENT)
Dept: CARDIOLOGY | Facility: CLINIC | Age: 64
End: 2024-04-25
Payer: COMMERCIAL

## 2024-04-25 ENCOUNTER — APPOINTMENT (OUTPATIENT)
Dept: DERMATOLOGY | Facility: CLINIC | Age: 64
End: 2024-04-25
Payer: MEDICAID

## 2024-04-25 VITALS
DIASTOLIC BLOOD PRESSURE: 79 MMHG | HEART RATE: 75 BPM | HEIGHT: 66 IN | OXYGEN SATURATION: 96 % | WEIGHT: 229 LBS | SYSTOLIC BLOOD PRESSURE: 132 MMHG | BODY MASS INDEX: 36.8 KG/M2

## 2024-04-25 DIAGNOSIS — E11.69 DYSLIPIDEMIA ASSOCIATED WITH TYPE 2 DIABETES MELLITUS (MULTI): ICD-10-CM

## 2024-04-25 DIAGNOSIS — I10 BENIGN ESSENTIAL HYPERTENSION: Primary | ICD-10-CM

## 2024-04-25 DIAGNOSIS — R06.09 DYSPNEA ON EXERTION: ICD-10-CM

## 2024-04-25 DIAGNOSIS — R07.89 ATYPICAL CHEST PAIN: ICD-10-CM

## 2024-04-25 DIAGNOSIS — E78.5 HYPERLIPIDEMIA, UNSPECIFIED HYPERLIPIDEMIA TYPE: ICD-10-CM

## 2024-04-25 DIAGNOSIS — E78.5 DYSLIPIDEMIA ASSOCIATED WITH TYPE 2 DIABETES MELLITUS (MULTI): ICD-10-CM

## 2024-04-25 PROCEDURE — 3052F HG A1C>EQUAL 8.0%<EQUAL 9.0%: CPT | Performed by: INTERNAL MEDICINE

## 2024-04-25 PROCEDURE — 93005 ELECTROCARDIOGRAM TRACING: CPT | Performed by: INTERNAL MEDICINE

## 2024-04-25 PROCEDURE — 4010F ACE/ARB THERAPY RXD/TAKEN: CPT | Performed by: INTERNAL MEDICINE

## 2024-04-25 PROCEDURE — 1125F AMNT PAIN NOTED PAIN PRSNT: CPT | Performed by: INTERNAL MEDICINE

## 2024-04-25 PROCEDURE — 3078F DIAST BP <80 MM HG: CPT | Performed by: INTERNAL MEDICINE

## 2024-04-25 PROCEDURE — 1159F MED LIST DOCD IN RCRD: CPT | Performed by: INTERNAL MEDICINE

## 2024-04-25 PROCEDURE — 1160F RVW MEDS BY RX/DR IN RCRD: CPT | Performed by: INTERNAL MEDICINE

## 2024-04-25 PROCEDURE — 99214 OFFICE O/P EST MOD 30 MIN: CPT | Performed by: INTERNAL MEDICINE

## 2024-04-25 PROCEDURE — 3048F LDL-C <100 MG/DL: CPT | Performed by: INTERNAL MEDICINE

## 2024-04-25 PROCEDURE — 1036F TOBACCO NON-USER: CPT | Performed by: INTERNAL MEDICINE

## 2024-04-25 PROCEDURE — 3075F SYST BP GE 130 - 139MM HG: CPT | Performed by: INTERNAL MEDICINE

## 2024-04-25 RX ORDER — ATORVASTATIN CALCIUM 40 MG/1
40 TABLET, FILM COATED ORAL DAILY
Qty: 90 TABLET | Refills: 3 | Status: SHIPPED | OUTPATIENT
Start: 2024-04-25 | End: 2025-04-25

## 2024-04-25 RX ORDER — OLMESARTAN MEDOXOMIL 5 MG/1
10 TABLET ORAL DAILY
Qty: 180 TABLET | Refills: 3 | Status: SHIPPED | OUTPATIENT
Start: 2024-04-25 | End: 2025-04-25

## 2024-04-25 RX ORDER — FUROSEMIDE 20 MG/1
20 TABLET ORAL
Qty: 36 TABLET | Refills: 3 | Status: SHIPPED | OUTPATIENT
Start: 2024-04-26 | End: 2025-04-26

## 2024-04-25 ASSESSMENT — ENCOUNTER SYMPTOMS
LOSS OF SENSATION IN FEET: 0
DEPRESSION: 0
OCCASIONAL FEELINGS OF UNSTEADINESS: 1

## 2024-04-25 ASSESSMENT — PAIN SCALES - GENERAL: PAINLEVEL: 10-WORST PAIN EVER

## 2024-04-25 NOTE — PROGRESS NOTES
I reviewed the resident/fellow's documentation and discussed the patient with the resident/fellow. I agree with the resident/fellow's medical decision making as documented in the note.     Julia Horton MD

## 2024-04-25 NOTE — PATIENT INSTRUCTIONS
"It was a pleasure seeing you today. I would like to see you back in clinic about 1-2 weeks after the completion of your studies. You can call my office if questions arise between now and our next visit.     Today, we talked about a lot of things     We will make some adjustments to your medications     1) Choletserol Lowering   -- Please increase your Atorvastatin from 20 mg --> 40 mg. You can use your current supply by taking 2 X 20 mg Until your prescription is used up     2) Blood PressureLip   -- We are making adjustments     -- STOP AMLODIPINE 10 mg DAILY     -- BEGIN OLMESARTAN 10 mg DAILY     -- CONTINUE  Atenolol - Chlorthalidone 50/25 mg Daily     -- ADD Furosemide ( Lasix )  20 mg  on Monday - Wednesday - Friday ( Or at least Monday / Thursday )  to help boost water removal.     Below are some Heart Health Tips that we provide to all of our patients. I hope you fing them useful.     - If you are having problems with medications, consider looking at the following websites.   --  \"[employer]\"   --  \"[facility]\"      - We are happy to supply written prescriptions if needed to allow you to obtain your medications from different pharmacies. Additionally, if you are having issues with mail order delivery, please let us know. We can send a limited supply of your medications to your local pharmacy.     -  We recommend you follow a heart healthy diet. Watch food labels and try not to eat more than 2,500 mg of sodium per day. Avoid foods high in salt like processed meats (lunch meats, sher, and sausage), processed foods (boxed dinners, canned soups), fried and fast foods. Monitor serving sizes and if the sodium per serving size is more than 200 mg, avoid those foods. If the sodium per serving size is between 100-200 mg, you can use those in limited quantities. Try to choose foods where the amount of sodium per serving size is less than 100 mg. Try to eat a diet rich in fruits and vegetables, " whole grains, low fat dairy products, skinless poultry and fish, nuts, beans, non-tropical vegetable oils. Limit saturated fat, trans fat, sodium, red meats, and sugar-sweetened beverages.   Limit alcohol     -The combination of a reduced-calorie diet and increased physical activity is recommended. Adults should aim to get at least 150 minutes of moderate physical activity per week (30 minutes of moderate physical activities at least 5 days per week). Examples of moderate physical activities include brisk walking, swimming, aerobic dancing, heavy gardening, jumping rope, bicycling 10 MPH or faster, tennis, hiking uphill or with a heavy backpack. Please let us know if you would like to learn more about your nutrition and calories and additional options including weight loss programs to help you reach your goal.     -If you smoke, stop smoking. If you stop smoking you can help get rid of a major source of stress to your heart. Smoking makes your heart rate and blood pressure go up and increases your risk or developing cardiovascular diseases and worsen symptoms associated with heart failure.     -Obtain a BP monitor and monitor your BP daily. Check it around the same time each day; at least 1 hour after taking your medications. Record your BP in a log and bring your log with you to your doctors appointment.     -F/u with your PCP as recommended.

## 2024-04-25 NOTE — ASSESSMENT & PLAN NOTE
Given risk Factors medications will be adjusted   -- Stop Amlodipine 10 mg   -- Begin Olmesartan 10 mg   -- Continue Atenolol - Chlorthalidone 50/25 Daily   -- Add Furosemide 20 mg M / W / F

## 2024-04-25 NOTE — ASSESSMENT & PLAN NOTE
Elevated cardiovascular risk 18% over 10 years with overall improved cholesterol panel  \--She is currently maintained on atorvastatin 20 mg  --We will increase this to 40 mg continue diet, exercise as best able

## 2024-04-25 NOTE — PROGRESS NOTES
"Referred by Dr. Hoang for Follow-up (3 month. Echo and CT cardiac scoring results) and Hypertension     History Of Present Illness:    Dot Sevilla is a 63 y.o. female has a pertinent medical history notable for Essential hypertension, diabetes mellitus type 2, obesity with BMI greater than 35, gastroesophageal reflux disease, microcytic anemia, depression, chronic gout, history of diabetic neuropathy, is referred to cardiology for establishment of care, discussion of stress test results showing poor exercise tolerance, elevated RVSP with exercise.      She states that she is unsure why she is at this appointment. She does not exercise, nor does she walk much. She has a fear of alling and no way of going to the gym to attend any exercise.  She was apparently referred for stress testing in May 2023 because \"I'm a diabetic\" She reports that she wa't really told why things were going on. She din't schedule SHAHRAM avelaution.     4/25/2024 -- She returns for follow up. She has been making strides to address her multiuple issue to help improve her overall cardiovascular care.  She is able to complete testing.  She does not that she still feels tired throughout most of the day.  She is trying to increase her exercise.  She offers no other cardiovascular complaints.    Patient denies chest pain and angina.  Pt denies shortness of breath, dyspnea on exertion, orthopnea, and paroxysmal nocturnal dyspnea.  Pt denies worsening lower extremity edema.  Pt denies palpitations or syncope.  No recent falls.  No fever or chills.  No cough.  No change in bowel or bladder habits.  No travel.  No sick contacts.  No recent travel    12 point review of systems was performed and is otherwise negative.  Past medical history:  As above.  Medications:  Reviewed.  Allergies:  Reviewed.  Social history:  Patient denies smoking, alcohol abuse, or illicit drug use.  Family history:  No sudden cardiac death or premature coronary artery " disease.        Past Medical History:  She has a past medical history of Acute pharyngitis, unspecified (03/15/2016), Candidiasis, unspecified (07/31/2020), Contact with and (suspected) exposure to potentially hazardous body fluids (12/02/2015), Dysuria (08/05/2014), Encounter for gynecological examination (general) (routine) without abnormal findings (07/31/2020), Encounter for gynecological examination (general) (routine) without abnormal findings (07/31/2020), Encounter for immunization (11/18/2014), Encounter for screening mammogram for malignant neoplasm of breast (03/01/2016), Hemorrhage of anus and rectum (06/19/2013), Melena (08/21/2013), Other conditions influencing health status (06/19/2013), Other conditions influencing health status (12/02/2015), Other disturbances of skin sensation (12/02/2015), Other specified soft tissue disorders (09/26/2014), Pain in right foot (05/10/2017), Pain in right foot (05/10/2017), Pain in unspecified knee (03/01/2016), Personal history of diseases of the blood and blood-forming organs and certain disorders involving the immune mechanism, Personal history of diseases of the skin and subcutaneous tissue (09/29/2015), Personal history of nicotine dependence, Personal history of other (healed) physical injury and trauma (06/19/2013), Personal history of other (healed) physical injury and trauma (06/19/2013), Personal history of other diseases of the circulatory system (08/21/2013), Personal history of other diseases of the female genital tract (08/28/2014), Personal history of other diseases of the female genital tract (06/19/2013), Personal history of other diseases of the nervous system and sense organs (06/10/2013), Personal history of other diseases of the nervous system and sense organs, Personal history of other diseases of the respiratory system (03/15/2016), Personal history of other diseases of the respiratory system (03/15/2016), Personal history of other diseases of  the respiratory system (06/19/2013), Personal history of other endocrine, nutritional and metabolic disease, Personal history of other infectious and parasitic diseases (08/28/2014), Personal history of other specified conditions (08/05/2014), Personal history of urinary (tract) infections (06/19/2013), Plantar fascial fibromatosis (04/08/2014), Pruritus, unspecified, Spontaneous ecchymoses (09/08/2014), Sprain of unspecified ligament of left ankle, initial encounter (05/10/2017), Type 2 diabetes mellitus without complications (Multi) (05/10/2017), Unspecified cataract (05/05/2017), Unspecified cataract (05/05/2017), Unspecified cataract (05/05/2017), Unspecified cataract (05/05/2017), Unspecified exotropia (05/05/2017), Unspecified exotropia (05/05/2017), Unspecified retinal break, left eye (05/10/2017), and Urinary tract infection, site not specified.    Past Surgical History:  She has a past surgical history that includes Hysterectomy (06/10/2013) and Oophorectomy (06/10/2013).      Social History:  She reports that she has never smoked. She has never used smokeless tobacco. She reports that she does not drink alcohol and does not use drugs.    Family History:  Family History   Problem Relation Name Age of Onset    Other (diabetes mellitus) Mother      Hypertension Mother      Other (stroke syndrome) Mother      Uterine cancer Mother      Other (diabetes mellitus) Father      Heart disease Father      Other (stroke syndrome) Father      Throat cancer Sister      Colon cancer Mother's Sister          Allergies:  Metronidazole    Outpatient Medications:  Current Outpatient Medications   Medication Instructions    acetaminophen (TYLENOL EXTRA STRENGTH) 1,000 mg, oral, Every 6 hours PRN    albuterol 90 mcg/actuation inhaler 1-2 puffs, inhalation, Every 6 hours PRN    albuterol 2.5 mg, nebulization, Every 6 hours PRN, USE 1 UNIT DOSE IN NEBULIZER EVERY 4 HOURS AS NEEDED.    amLODIPine (NORVASC) 10 mg, oral, Daily, As  "directed    atenoloL-chlorthalidone (Tenoretic) 50-25 mg tablet 1 tablet, oral, Daily, As directed    atorvastatin (LIPITOR) 40 mg, oral, Daily    cetirizine (ZYRTEC) 10 mg, oral, Nightly    colchicine 0.6 mg, oral, 2 times daily, TAKE 1 TABLET 3 times daily PRN Gout flare up    dextromethorphan-guaifenesin (Robitussin DM)  mg/5 mL syrup TAKE 10 ML EVERY 4-6 HOURS AS NEEDED    famotidine (PEPCID) 20 mg, oral, Daily PRN, hearatburn    flash glucose sensor (FREESTYLE NITA 2 SENSOR MISC) Test every day    fluticasone (Flonase) 50 mcg/actuation nasal spray 1-2 sprays, Each Nostril, Daily    FreeStyle Lite Strips strip 1 strip, subcutaneous (via wearable injector), 4 times daily before meals and nightly, TEST DAILY AND AS NEEDED AS DIRECTED    [START ON 4/26/2024] furosemide (LASIX) 20 mg, oral, Every Mon/Wed/Fri    lidocaine (LMX 4) 4 % cream Topical, 4 times daily PRN    montelukast (SINGULAIR) 10 mg, oral, Nightly    olmesartan (BENICAR) 10 mg, oral, Daily    Symbicort 160-4.5 mcg/actuation inhaler INHALE 2 PUFFS IN THE MORNING AND 2 PUFFS IN THE EVENING. RINSE MOUTH AFTER USE.    traZODone (DESYREL) 100 mg, oral, Nightly    Trulicity 0.75 mg, subcutaneous, Once Weekly        Last Recorded Vitals:  Vitals:    04/25/24 0856   BP: 132/79   Patient Position: Sitting   Pulse: 75   SpO2: 96%   Weight: 104 kg (229 lb)   Height: 1.676 m (5' 6\")         Physical Exam:  Vitals and nursing note reviewed.   Constitutional:       Appearance: Healthy appearance. Not in distress.   Eyes:      Pupils: Pupils are equal, round, and reactive to light.   Neck:      Vascular: JVD normal.      Lymphadenopathy: No cervical adenopathy.   Pulmonary:      Effort: Pulmonary effort is normal.      Breath sounds: Normal breath sounds. No wheezing. No rhonchi. No rales.   Chest:      Chest wall: Not tender to palpatation.   Cardiovascular:      Normal rate. Regular rhythm. Normal S1. Normal S2.       Murmurs: There is no murmur.      No " gallop.  No rub.   Pulses:     Intact distal pulses.   Edema:     Peripheral edema absent.   Abdominal:      General: Bowel sounds are normal.      Palpations: Abdomen is soft.   Musculoskeletal: Normal range of motion.      Cervical back: Normal range of motion. Skin:     General: Skin is warm and dry.   Neurological:      General: No focal deficit present.      Mental Status: Alert and oriented to person, place and time.       Last Labs:  CBC -  Lab Results   Component Value Date    WBC 9.3 01/29/2024    HGB 12.2 01/29/2024    HCT 38.5 01/29/2024    MCV 78 (L) 01/29/2024     01/29/2024       CMP -  Lab Results   Component Value Date    CALCIUM 9.5 04/23/2024    PHOS 3.8 04/23/2024    PROT 7.4 01/29/2024    ALBUMIN 4.0 04/23/2024    AST 17 01/29/2024    ALT 17 01/29/2024    ALKPHOS 103 01/29/2024    BILITOT 0.4 01/29/2024       LIPID PANEL -   Lab Results   Component Value Date    CHOL 131 01/29/2024    TRIG 69 01/29/2024    HDL 43.5 01/29/2024    CHHDL 3.0 01/29/2024    LDLF 135 (H) 01/24/2023    VLDL 14 01/29/2024    NHDL 88 01/29/2024       RENAL FUNCTION PANEL -   Lab Results   Component Value Date    GLUCOSE 150 (H) 04/23/2024     04/23/2024    K 3.8 04/23/2024    CL 98 04/23/2024    CO2 33 (H) 04/23/2024    ANIONGAP 14 04/23/2024    BUN 25 (H) 04/23/2024    CREATININE 1.03 04/23/2024    CALCIUM 9.5 04/23/2024    PHOS 3.8 04/23/2024    ALBUMIN 4.0 04/23/2024        Lab Results   Component Value Date    BNP 23 01/29/2024    HGBA1C 8.1 (H) 04/23/2024       Last Cardiology Tests:  ECG:  In office EKG 4/25/2024 --   IN Office EKG 1/26/2024 -- Sinus Rhythm    Echo:  Echocardiogram 2/2024   1. Left ventricular systolic function is normal with a 60-65% estimated ejection fraction.   2. Spectral Doppler shows an impaired relaxation pattern of left ventricular diastolic filling     1. The left ventricular systolic function is normal with a 60-65% estimated ejection fraction.   2. Spectral Doppler shows an  impaired relaxation pattern of left ventricular diastolic filling.   3. Slightly elevated RVSP.    Stress Test:  05/2023 Exercisde Stress Echo  Baseline Echo: Global LV systolic function is normal. The resting baseline ejection fraction was estimated at 55 to 60%. There are no regional wall motion abnormalities at baseline.     Stress Echo: There are no stress induced regional wall motion abnormalities. The ejection fraction is approximately 65 to 70% at peak stress. The left ventricular internal cavity size at peak stress is decreased. At peak stress, the global LV systolic function is increased.     Mitral Valve:  Resting  MV E Vmax:  0.59 m/s  MV A Vmax:  0.60 m/s  MV E/A:     0.99  Lateral e': 0.09 m/s  Medial e':  0.07 m/s  MV DT:      209 msec           Tricuspid Valve:  Resting  TR Vmax:      3.13 m/s  TR Peak Grad: 22.4 mmHg  RVSP:         27.4 mmHg     Summary:  1. The resting ejection fraction was estimated at 55 to 60% with a peak exercise ejection fraction estimated at 65 to 70%.  2. There is no evidence of inducible ischemia by stress echocardiography.  3. There is poor functional capacity with decreased exercise tolerance and evidence of exercise-induced pulmonary hypertension. Estimated RVSP increased from 14 mmHg at rest to 39 mmHg during peak stress.  4. No clinical, echocardiographic or electrocardiographic evidence for ischemia at a maximal workload.  5. The adequate level of stress was achieved.    Cardiac Imaging:  CT coronary calcium score of 4/2024  FINDINGS:  The score and distribution of calcium in the coronary arteries is as  follows:      LM 0,  LAD 47,  LCx 0,  RCA 0,      Total 47      The visualized ascending thoracic aorta measures 3.3 cm in diameter.  The heart is normal in size. No pericardial effusion is present. Mild  focal atherosclerotic disease of the transverse thoracic aorta      Lab review: I have personally reviewed the laboratory result(s)   Diagnostic review: I have  "personally reviewed the result(s) of the EKG and Echocardiogram .     Assessment/Plan     Problem List Items Addressed This Visit       Atypical chest pain    Relevant Orders    ECG 12 lead (Clinic Performed) (Completed)    Comprehensive metabolic panel    Benign essential hypertension - Primary    Overview     Currently maintained on following medications as of 4/2024  -- Amlodipine 10 mg  --Atenolol-hydrochlorothiazide 50/25 mg daily         Current Assessment & Plan     Given risk Factors medications will be adjusted   -- Stop Amlodipine 10 mg   -- Begin Olmesartan 10 mg   -- Continue Atenolol - Chlorthalidone 50/25 Daily   -- Add Furosemide 20 mg M / W / F            Relevant Medications    olmesartan (Benicar) 5 mg tablet    furosemide (Lasix) 20 mg tablet (Start on 4/26/2024)    Other Relevant Orders    ECG 12 lead (Clinic Performed) (Completed)    Comprehensive metabolic panel    Dyslipidemia associated with type 2 diabetes mellitus (Multi)    Overview     The 10-year ASCVD risk score (Bradly ELI, et al., 2019) is: 18.4%    Values used to calculate the score:      Age: 63 years      Sex: Female      Is Non- : Yes      Diabetic: Yes      Tobacco smoker: No      Systolic Blood Pressure: 140 mmHg      Is BP treated: Yes      HDL Cholesterol: 43.5 mg/dL      Total Cholesterol: 131 mg/dL    Lab Results   Component Value Date    CHOL 131 01/29/2024    CHOL 201 (H) 01/24/2023    CHOL 170 01/07/2022     Lab Results   Component Value Date    HDL 43.5 01/29/2024    HDL 46.5 01/24/2023    HDL 38.0 (A) 01/07/2022     Lab Results   Component Value Date    LDLCALC 74 01/29/2024     Lab Results   Component Value Date    TRIG 69 01/29/2024    TRIG 100 01/24/2023    TRIG 181 (H) 01/07/2022     No components found for: \"CHOLHDL\"           Current Assessment & Plan     Elevated cardiovascular risk 18% over 10 years with overall improved cholesterol panel  \--She is currently maintained on atorvastatin 20 " mg  --We will increase this to 40 mg continue diet, exercise as best able         Relevant Orders    ECG 12 lead (Clinic Performed) (Completed)    Lipid Panel    Comprehensive metabolic panel    Dyspnea on exertion    Overview     Chronic Exertion with Exercise Stress Echocardiogram in 2023 showing poor exercise tolerance, no inducible ischemia, elevated RVSP in response to stress.  --CT coronary artery calcium score showed minimal disease burden however some dilation of the central vasculature suggesting elevated filling pressures  --Echocardiogram 2/2024 suggested elevated right atrial pressures with slightly elevated RVSP         Current Assessment & Plan     At improved diuretic 3 times a week for fluid removal to reduce RVSP, right atrial pressure  --          Relevant Medications    olmesartan (Benicar) 5 mg tablet    furosemide (Lasix) 20 mg tablet (Start on 4/26/2024)    Other Relevant Orders    ECG 12 lead (Clinic Performed) (Completed)    Comprehensive metabolic panel     Other Visit Diagnoses       Hyperlipidemia, unspecified hyperlipidemia type        Relevant Medications    atorvastatin (Lipitor) 40 mg tablet    Other Relevant Orders    ECG 12 lead (Clinic Performed) (Completed)    Lipid Panel    Comprehensive metabolic panel              Marshall Hoang DO   Clinical Cardology   [facility]  [facility]

## 2024-04-26 DIAGNOSIS — E11.9 DIABETES MELLITUS TYPE 2 WITHOUT RETINOPATHY (MULTI): ICD-10-CM

## 2024-04-26 DIAGNOSIS — N39.41 URGE INCONTINENCE OF URINE: Primary | ICD-10-CM

## 2024-04-26 RX ORDER — DIAPER,BRIEF,ADULT, DISPOSABLE
1 EACH MISCELLANEOUS DAILY PRN
Qty: 120 EACH | Refills: 11 | Status: SHIPPED | OUTPATIENT
Start: 2024-04-26

## 2024-04-26 RX ORDER — DULAGLUTIDE 1.5 MG/.5ML
1.5 INJECTION, SOLUTION SUBCUTANEOUS
Qty: 2 ML | Refills: 11 | Status: SHIPPED | OUTPATIENT
Start: 2024-04-28

## 2024-04-27 ENCOUNTER — HOME CARE VISIT (OUTPATIENT)
Dept: HOME HEALTH SERVICES | Facility: HOME HEALTH | Age: 64
End: 2024-04-27
Payer: COMMERCIAL

## 2024-04-27 PROCEDURE — G0152 HHCP-SERV OF OT,EA 15 MIN: HCPCS

## 2024-04-28 VITALS
TEMPERATURE: 98.9 F | DIASTOLIC BLOOD PRESSURE: 76 MMHG | RESPIRATION RATE: 16 BRPM | HEART RATE: 98 BPM | SYSTOLIC BLOOD PRESSURE: 130 MMHG | OXYGEN SATURATION: 98 %

## 2024-04-28 ASSESSMENT — ENCOUNTER SYMPTOMS
PAIN SEVERITY GOAL: 0/10
PERSON REPORTING PAIN: PATIENT
PAIN LOCATION - PAIN SEVERITY: 8/10
PAIN LOCATION - RELIEVING FACTORS: UNLOADING
PAIN: 1
PAIN LOCATION - PAIN FREQUENCY: FREQUENT
SUBJECTIVE PAIN PROGRESSION: GRADUALLY IMPROVING
PAIN LOCATION - PAIN DURATION: 1 TO 2 HOURS
PAIN LOCATION: LEFT KNEE
LOWEST PAIN SEVERITY IN PAST 24 HOURS: 6/10
HIGHEST PAIN SEVERITY IN PAST 24 HOURS: 8/10
PAIN LOCATION - EXACERBATING FACTORS: LOADING

## 2024-04-28 ASSESSMENT — PAIN SCALES - PAIN ASSESSMENT IN ADVANCED DEMENTIA (PAINAD)
BREATHING: 1
FACIALEXPRESSION: 0
NEGVOCALIZATION: 0 - NONE.
TOTALSCORE: 1
CONSOLABILITY: 0
NEGVOCALIZATION: 0
FACIALEXPRESSION: 0 - SMILING OR INEXPRESSIVE.
BODYLANGUAGE: 0
BODYLANGUAGE: 0 - RELAXED.
CONSOLABILITY: 0 - NO NEED TO CONSOLE.

## 2024-04-28 ASSESSMENT — ACTIVITIES OF DAILY LIVING (ADL)
GROOMING_CURRENT_FUNCTION: MINIMUM ASSIST
DRESSING_UB_CURRENT_FUNCTION: MINIMUM ASSIST
CURRENT_FUNCTION: MINIMUM ASSIST
PREPARING MEALS: NEEDS ASSISTANCE
DRESSING_LB_CURRENT_FUNCTION: MINIMUM ASSIST
PHYSICAL TRANSFERS ASSESSED: 1
GROOMING ASSESSED: 1

## 2024-04-29 ENCOUNTER — HOME CARE VISIT (OUTPATIENT)
Dept: HOME HEALTH SERVICES | Facility: HOME HEALTH | Age: 64
End: 2024-04-29
Payer: COMMERCIAL

## 2024-04-29 PROCEDURE — G0151 HHCP-SERV OF PT,EA 15 MIN: HCPCS

## 2024-04-29 SDOH — HEALTH STABILITY: PHYSICAL HEALTH: EXERCISE ACTIVITY: SEATED HEP

## 2024-04-29 SDOH — HEALTH STABILITY: PHYSICAL HEALTH: PHYSICAL EXERCISE: SEATED

## 2024-04-29 SDOH — HEALTH STABILITY: PHYSICAL HEALTH: EXERCISE ACTIVITIES SETS: 2

## 2024-04-29 SDOH — HEALTH STABILITY: PHYSICAL HEALTH: PHYSICAL EXERCISE: 15

## 2024-04-29 SDOH — HEALTH STABILITY: PHYSICAL HEALTH: EXERCISE ACTIVITY: STANDING HEP

## 2024-04-29 SDOH — HEALTH STABILITY: PHYSICAL HEALTH: PHYSICAL EXERCISE: STANDING

## 2024-04-29 SDOH — HEALTH STABILITY: PHYSICAL HEALTH: PHYSICAL EXERCISE: 10

## 2024-04-29 SDOH — HEALTH STABILITY: PHYSICAL HEALTH
EXERCISE COMMENTS: STANDING HEP INCLUDES: CALF RAISES, KNEE FLEXION, HIP FLEXION, MARCHING, HIP ABDUCTION, HIP EXTENSION, AND LATERAL STEPPING  SEATED HEP INCLUDES ANKLE PUMPS, LAQS, HIP FLEXION, AND HIP ABDUCTION

## 2024-04-29 SDOH — HEALTH STABILITY: PHYSICAL HEALTH: EXERCISE TYPE: STANDING AND SEATED HEP

## 2024-04-29 ASSESSMENT — ENCOUNTER SYMPTOMS
HIGHEST PAIN SEVERITY IN PAST 24 HOURS: 8/10
OCCASIONAL FEELINGS OF UNSTEADINESS: 1
LOSS OF SENSATION IN FEET: 1
PAIN LOCATION - EXACERBATING FACTORS: PROLONGED STANDING
PERSON REPORTING PAIN: PATIENT
LIMITED RANGE OF MOTION: 1
ARTHRALGIAS: 1
DEPRESSION: 0
PAIN: PATIENT DILIGENT WITH PAIN CONTROL TECHNIQUES
PAIN LOCATION - PAIN FREQUENCY: FREQUENT
PAIN: 1
MUSCLE WEAKNESS: 1
PAIN LOCATION - RELIEVING FACTORS: THERMAL MODALITIES, MEDS
PAIN LOCATION - PAIN SEVERITY: 8/10
PAIN SEVERITY GOAL: 3/10
SUBJECTIVE PAIN PROGRESSION: WAXING AND WANING
LOWEST PAIN SEVERITY IN PAST 24 HOURS: 5/10
PAIN LOCATION: LEFT KNEE

## 2024-04-29 ASSESSMENT — ACTIVITIES OF DAILY LIVING (ADL)
CURRENT_FUNCTION: INDEPENDENT
AMBULATION ASSISTANCE ON FLAT SURFACES: 1
AMBULATION ASSISTANCE: ONE PERSON
AMBULATION_DISTANCE/DURATION_TOLERATED: 150 FEET WITH SUPERVISION AND NO DEVICE
PHYSICAL TRANSFERS ASSESSED: 1
AMBULATION ASSISTANCE: 1
AMBULATION ASSISTANCE: STAND BY ASSIST

## 2024-04-30 PROCEDURE — G0180 MD CERTIFICATION HHA PATIENT: HCPCS | Performed by: EMERGENCY MEDICINE

## 2024-05-01 ENCOUNTER — HOME CARE VISIT (OUTPATIENT)
Dept: HOME HEALTH SERVICES | Facility: HOME HEALTH | Age: 64
End: 2024-05-01
Payer: COMMERCIAL

## 2024-05-01 VITALS
SYSTOLIC BLOOD PRESSURE: 122 MMHG | TEMPERATURE: 98.9 F | HEART RATE: 78 BPM | RESPIRATION RATE: 16 BRPM | OXYGEN SATURATION: 98 % | DIASTOLIC BLOOD PRESSURE: 70 MMHG

## 2024-05-01 LAB
ATRIAL RATE: 75 BPM
P AXIS: 3 DEGREES
P OFFSET: 179 MS
P ONSET: 125 MS
PR INTERVAL: 202 MS
Q ONSET: 226 MS
QRS COUNT: 12 BEATS
QRS DURATION: 90 MS
QT INTERVAL: 382 MS
QTC CALCULATION(BAZETT): 426 MS
QTC FREDERICIA: 411 MS
R AXIS: 10 DEGREES
T AXIS: 2 DEGREES
T OFFSET: 417 MS
VENTRICULAR RATE: 75 BPM

## 2024-05-01 PROCEDURE — G0155 HHCP-SVS OF CSW,EA 15 MIN: HCPCS

## 2024-05-01 PROCEDURE — G0152 HHCP-SERV OF OT,EA 15 MIN: HCPCS

## 2024-05-01 SDOH — HEALTH STABILITY: MENTAL HEALTH: STRESS FACTORS COMMENTS: INCREASED PHYSICAL LIMITATIONS

## 2024-05-01 SDOH — HEALTH STABILITY: MENTAL HEALTH: SOCIAL ISOLATION: 1

## 2024-05-01 ASSESSMENT — ACTIVITIES OF DAILY LIVING (ADL)
AMBULATION_REQUIRES_ASSISTANCE: 1
LAUNDRY_REQUIRES_ASSISTANCE: 1

## 2024-05-01 ASSESSMENT — ENCOUNTER SYMPTOMS: AGITATION: 1

## 2024-05-03 ENCOUNTER — HOME CARE VISIT (OUTPATIENT)
Dept: HOME HEALTH SERVICES | Facility: HOME HEALTH | Age: 64
End: 2024-05-03
Payer: COMMERCIAL

## 2024-05-04 ASSESSMENT — PAIN SCALES - PAIN ASSESSMENT IN ADVANCED DEMENTIA (PAINAD)
CONSOLABILITY: 0 - NO NEED TO CONSOLE.
FACIALEXPRESSION: 0
NEGVOCALIZATION: 0 - NONE.
CONSOLABILITY: 0
BODYLANGUAGE: 0
BODYLANGUAGE: 0 - RELAXED.
TOTALSCORE: 1
NEGVOCALIZATION: 0
BREATHING: 1
FACIALEXPRESSION: 0 - SMILING OR INEXPRESSIVE.

## 2024-05-04 ASSESSMENT — ENCOUNTER SYMPTOMS
PAIN LOCATION: LEFT KNEE
PERSON REPORTING PAIN: PATIENT
PAIN LOCATION - EXACERBATING FACTORS: STANDING
SUBJECTIVE PAIN PROGRESSION: GRADUALLY IMPROVING
PAIN LOCATION - RELIEVING FACTORS: UNLOADING
HIGHEST PAIN SEVERITY IN PAST 24 HOURS: 5/10
PAIN: 1
LOWEST PAIN SEVERITY IN PAST 24 HOURS: 4/10
PAIN LOCATION - PAIN DURATION: 1 TO 2 HOURS
PAIN LOCATION - PAIN FREQUENCY: FREQUENT
PAIN LOCATION - PAIN SEVERITY: 5/10
PAIN SEVERITY GOAL: 0/10

## 2024-05-04 ASSESSMENT — ACTIVITIES OF DAILY LIVING (ADL)
DRESSING_UB_CURRENT_FUNCTION: MINIMUM ASSIST
PHYSICAL TRANSFERS ASSESSED: 1
LIGHT HOUSEKEEPING: NEEDS ASSISTANCE
HOUSEKEEPING ASSESSED: 1
PREPARING MEALS: NEEDS ASSISTANCE
GROOMING_CURRENT_FUNCTION: MINIMUM ASSIST
DRESSING_LB_CURRENT_FUNCTION: MINIMUM ASSIST
GROOMING ASSESSED: 1
CURRENT_FUNCTION: MINIMUM ASSIST

## 2024-05-06 ENCOUNTER — HOME CARE VISIT (OUTPATIENT)
Dept: HOME HEALTH SERVICES | Facility: HOME HEALTH | Age: 64
End: 2024-05-06
Payer: COMMERCIAL

## 2024-05-06 PROCEDURE — G0151 HHCP-SERV OF PT,EA 15 MIN: HCPCS

## 2024-05-06 SDOH — HEALTH STABILITY: PHYSICAL HEALTH: EXERCISE ACTIVITIES SETS: 1

## 2024-05-06 SDOH — HEALTH STABILITY: PHYSICAL HEALTH: PHYSICAL EXERCISE: STANDING

## 2024-05-06 SDOH — HEALTH STABILITY: PHYSICAL HEALTH: PHYSICAL EXERCISE: 15

## 2024-05-06 SDOH — HEALTH STABILITY: PHYSICAL HEALTH: EXERCISE ACTIVITIES SETS: 2

## 2024-05-06 SDOH — ECONOMIC STABILITY: HOUSING INSECURITY: OPEN FLAME PRESENT: 1

## 2024-05-06 SDOH — HEALTH STABILITY: PHYSICAL HEALTH: EXERCISE ACTIVITY: DYNAMIC BALANCE HEP

## 2024-05-06 SDOH — HEALTH STABILITY: PHYSICAL HEALTH
EXERCISE COMMENTS: STANDING HEP INCLUDES: CALF RAISES, KNEE FLEXION, HIP FLEXION, MARCHING, HIP ABDUCTION, HIP EXTENSION, AND MINI SQUATS  BALANCE PROGRAM ACTIVITIES INCLUDE:  SIDESTEPPING, FORWARD AND RETRO MARCHING, BACKWARD WALKING, TANDEM STANCE FOOT POSITIONS WITH

## 2024-05-06 SDOH — HEALTH STABILITY: PHYSICAL HEALTH: EXERCISE ACTIVITY: STANDING HEP

## 2024-05-06 SDOH — HEALTH STABILITY: PHYSICAL HEALTH: EXERCISE COMMENTS: ALTERNATING TRUNK ROTATIONS WITH REACHING, SINGLE LEG STANCE BALANCE ACTIVITIES

## 2024-05-06 ASSESSMENT — ENCOUNTER SYMPTOMS
PAIN LOCATION - EXACERBATING FACTORS: PROLONGED AMBULATION
SUBJECTIVE PAIN PROGRESSION: UNCHANGED
PAIN: 1
OCCASIONAL FEELINGS OF UNSTEADINESS: 1
LIMITED RANGE OF MOTION: 1
PAIN SEVERITY GOAL: 2/10
PAIN LOCATION: LEFT KNEE
PAIN LOCATION - PAIN FREQUENCY: FREQUENT
DEPRESSION: 0
PAIN: PATIENT DILIGENT WITH PAIN CONTROL TECHNIQUES
HIGHEST PAIN SEVERITY IN PAST 24 HOURS: 6/10
ARTHRALGIAS: 1
LOWEST PAIN SEVERITY IN PAST 24 HOURS: 3/10
LOSS OF SENSATION IN FEET: 0
PAIN LOCATION - PAIN SEVERITY: 3/10
MUSCLE WEAKNESS: 1

## 2024-05-06 ASSESSMENT — ACTIVITIES OF DAILY LIVING (ADL)
CURRENT_FUNCTION: INDEPENDENT
AMBULATION ASSISTANCE ON FLAT SURFACES: 1
AMBULATION ASSISTANCE: ONE PERSON
PHYSICAL TRANSFERS ASSESSED: 1
AMBULATION ASSISTANCE: 1
AMBULATION ASSISTANCE: STAND BY ASSIST

## 2024-05-07 ENCOUNTER — HOME CARE VISIT (OUTPATIENT)
Dept: HOME HEALTH SERVICES | Facility: HOME HEALTH | Age: 64
End: 2024-05-07
Payer: COMMERCIAL

## 2024-05-09 ENCOUNTER — HOME CARE VISIT (OUTPATIENT)
Dept: HOME HEALTH SERVICES | Facility: HOME HEALTH | Age: 64
End: 2024-05-09
Payer: COMMERCIAL

## 2024-05-11 ENCOUNTER — HOME CARE VISIT (OUTPATIENT)
Dept: HOME HEALTH SERVICES | Facility: HOME HEALTH | Age: 64
End: 2024-05-11
Payer: COMMERCIAL

## 2024-05-13 ENCOUNTER — HOME CARE VISIT (OUTPATIENT)
Dept: HOME HEALTH SERVICES | Facility: HOME HEALTH | Age: 64
End: 2024-05-13
Payer: COMMERCIAL

## 2024-05-13 PROCEDURE — G0152 HHCP-SERV OF OT,EA 15 MIN: HCPCS

## 2024-05-14 ENCOUNTER — HOME CARE VISIT (OUTPATIENT)
Dept: HOME HEALTH SERVICES | Facility: HOME HEALTH | Age: 64
End: 2024-05-14
Payer: COMMERCIAL

## 2024-05-14 PROCEDURE — G0151 HHCP-SERV OF PT,EA 15 MIN: HCPCS

## 2024-05-14 SDOH — HEALTH STABILITY: PHYSICAL HEALTH: PHYSICAL EXERCISE: STANDING

## 2024-05-14 SDOH — HEALTH STABILITY: PHYSICAL HEALTH
EXERCISE COMMENTS: STANDING HEP INCLUDES: CALF RAISES, KNEE FLEXION, HIP FLEXION, MARCHING, HIP ABDUCTION, HIP EXTENSION, AND MINI SQUATS

## 2024-05-14 SDOH — HEALTH STABILITY: PHYSICAL HEALTH: EXERCISE ACTIVITY: STANDING HEP

## 2024-05-14 SDOH — HEALTH STABILITY: PHYSICAL HEALTH: EXERCISE ACTIVITIES SETS: 2

## 2024-05-14 SDOH — HEALTH STABILITY: PHYSICAL HEALTH: EXERCISE TYPE: STANDING HEP

## 2024-05-14 SDOH — HEALTH STABILITY: PHYSICAL HEALTH: PHYSICAL EXERCISE: 15

## 2024-05-14 ASSESSMENT — ACTIVITIES OF DAILY LIVING (ADL)
AMBULATION ASSISTANCE: 1
AMBULATION ASSISTANCE ON FLAT SURFACES: 1
AMBULATION ASSISTANCE: INDEPENDENT
CURRENT_FUNCTION: INDEPENDENT
PHYSICAL TRANSFERS ASSESSED: 1

## 2024-05-14 ASSESSMENT — ENCOUNTER SYMPTOMS
PAIN LOCATION: LEFT KNEE
LOSS OF SENSATION IN FEET: 0
PAIN LOCATION - PAIN FREQUENCY: FREQUENT
LIMITED RANGE OF MOTION: 1
PAIN SEVERITY GOAL: 3/10
PERSON REPORTING PAIN: PATIENT
PAIN: PATIENT INDEPENDENT WITH PAIN CONTROL TECHNIQUES
LOWEST PAIN SEVERITY IN PAST 24 HOURS: 3/10
PAIN LOCATION - EXACERBATING FACTORS: PROLONGED STANDING AND WALKING
PAIN: 1
DEPRESSION: 0
MUSCLE WEAKNESS: 1
OCCASIONAL FEELINGS OF UNSTEADINESS: 1
PAIN LOCATION - PAIN SEVERITY: 5/10
SUBJECTIVE PAIN PROGRESSION: UNCHANGED
HIGHEST PAIN SEVERITY IN PAST 24 HOURS: 7/10
ARTHRALGIAS: 1

## 2024-05-15 VITALS
SYSTOLIC BLOOD PRESSURE: 122 MMHG | TEMPERATURE: 98.7 F | DIASTOLIC BLOOD PRESSURE: 78 MMHG | RESPIRATION RATE: 16 BRPM | OXYGEN SATURATION: 98 % | HEART RATE: 86 BPM

## 2024-05-15 ASSESSMENT — ENCOUNTER SYMPTOMS
LOWEST PAIN SEVERITY IN PAST 24 HOURS: 4/10
HIGHEST PAIN SEVERITY IN PAST 24 HOURS: 5/10
PAIN LOCATION - RELIEVING FACTORS: UNLOADING
PAIN LOCATION: LEFT KNEE
PAIN SEVERITY GOAL: 0/10
SUBJECTIVE PAIN PROGRESSION: WAXING AND WANING
PERSON REPORTING PAIN: PATIENT
PAIN LOCATION - PAIN SEVERITY: 5/10
PAIN: 1
PAIN LOCATION - PAIN FREQUENCY: FREQUENT
PAIN LOCATION - PAIN DURATION: 1 TO 2 HOURS

## 2024-05-15 ASSESSMENT — PAIN SCALES - PAIN ASSESSMENT IN ADVANCED DEMENTIA (PAINAD)
BODYLANGUAGE: 0 - RELAXED.
CONSOLABILITY: 0
NEGVOCALIZATION: 0
FACIALEXPRESSION: 0 - SMILING OR INEXPRESSIVE.
FACIALEXPRESSION: 0
TOTALSCORE: 0
BODYLANGUAGE: 0
BREATHING: 0
CONSOLABILITY: 0 - NO NEED TO CONSOLE.
NEGVOCALIZATION: 0 - NONE.

## 2024-05-15 ASSESSMENT — ACTIVITIES OF DAILY LIVING (ADL)
CURRENT_FUNCTION: MINIMUM ASSIST
GROOMING_CURRENT_FUNCTION: CONTACT GUARD ASSIST
FEEDING: CONTACT GUARD ASSIST
DRESSING_LB_CURRENT_FUNCTION: CONTACT GUARD ASSIST
DRESSING_UB_CURRENT_FUNCTION: CONTACT GUARD ASSIST
PHYSICAL TRANSFERS ASSESSED: 1
FEEDING ASSESSED: 1
GROOMING ASSESSED: 1

## 2024-05-17 ENCOUNTER — HOME CARE VISIT (OUTPATIENT)
Dept: HOME HEALTH SERVICES | Facility: HOME HEALTH | Age: 64
End: 2024-05-17
Payer: COMMERCIAL

## 2024-05-17 PROCEDURE — G0152 HHCP-SERV OF OT,EA 15 MIN: HCPCS

## 2024-05-18 ASSESSMENT — ENCOUNTER SYMPTOMS
PERSON REPORTING PAIN: PATIENT
PAIN LOCATION: LEFT KNEE
HIGHEST PAIN SEVERITY IN PAST 24 HOURS: 10/10
PAIN LOCATION - PAIN FREQUENCY: CONSTANT
PAIN LOCATION - PAIN SEVERITY: 10/10
LOWEST PAIN SEVERITY IN PAST 24 HOURS: 9/10
PAIN: 1
PAIN SEVERITY GOAL: 0/10
PAIN LOCATION - PAIN QUALITY: BURNING
SUBJECTIVE PAIN PROGRESSION: UNCHANGED

## 2024-05-18 ASSESSMENT — ACTIVITIES OF DAILY LIVING (ADL)
GROOMING_CURRENT_FUNCTION: MINIMUM ASSIST
GROOMING ASSESSED: 1
FEEDING: MINIMUM ASSIST
FEEDING ASSESSED: 1

## 2024-05-18 ASSESSMENT — PAIN SCALES - PAIN ASSESSMENT IN ADVANCED DEMENTIA (PAINAD)
FACIALEXPRESSION: 0 - SMILING OR INEXPRESSIVE.
BODYLANGUAGE: 0 - RELAXED.
FACIALEXPRESSION: 0
BODYLANGUAGE: 0
NEGVOCALIZATION: 0
CONSOLABILITY: 0 - NO NEED TO CONSOLE.
CONSOLABILITY: 0
BREATHING: 1
TOTALSCORE: 1
NEGVOCALIZATION: 0 - NONE.

## 2024-05-24 ENCOUNTER — HOME CARE VISIT (OUTPATIENT)
Dept: HOME HEALTH SERVICES | Facility: HOME HEALTH | Age: 64
End: 2024-05-24
Payer: COMMERCIAL

## 2024-05-24 ENCOUNTER — HOSPITAL ENCOUNTER (OUTPATIENT)
Dept: RADIOLOGY | Facility: EXTERNAL LOCATION | Age: 64
Discharge: HOME | End: 2024-05-24

## 2024-05-24 ENCOUNTER — OFFICE VISIT (OUTPATIENT)
Dept: ORTHOPEDIC SURGERY | Facility: CLINIC | Age: 64
End: 2024-05-24
Payer: COMMERCIAL

## 2024-05-24 VITALS — WEIGHT: 229 LBS | HEIGHT: 66 IN | BODY MASS INDEX: 36.8 KG/M2

## 2024-05-24 DIAGNOSIS — M17.12 PRIMARY OSTEOARTHRITIS OF LEFT KNEE: Primary | ICD-10-CM

## 2024-05-24 DIAGNOSIS — M25.562 ACUTE PAIN OF LEFT KNEE: ICD-10-CM

## 2024-05-24 PROCEDURE — 3052F HG A1C>EQUAL 8.0%<EQUAL 9.0%: CPT | Performed by: ORTHOPAEDIC SURGERY

## 2024-05-24 PROCEDURE — 1036F TOBACCO NON-USER: CPT | Performed by: ORTHOPAEDIC SURGERY

## 2024-05-24 PROCEDURE — 3048F LDL-C <100 MG/DL: CPT | Performed by: ORTHOPAEDIC SURGERY

## 2024-05-24 PROCEDURE — 1159F MED LIST DOCD IN RCRD: CPT | Performed by: ORTHOPAEDIC SURGERY

## 2024-05-24 PROCEDURE — 4010F ACE/ARB THERAPY RXD/TAKEN: CPT | Performed by: ORTHOPAEDIC SURGERY

## 2024-05-24 PROCEDURE — 20610 DRAIN/INJ JOINT/BURSA W/O US: CPT | Performed by: ORTHOPAEDIC SURGERY

## 2024-05-24 PROCEDURE — 99204 OFFICE O/P NEW MOD 45 MIN: CPT | Performed by: ORTHOPAEDIC SURGERY

## 2024-05-24 PROCEDURE — 1160F RVW MEDS BY RX/DR IN RCRD: CPT | Performed by: ORTHOPAEDIC SURGERY

## 2024-05-24 RX ORDER — LIDOCAINE HYDROCHLORIDE 20 MG/ML
2 INJECTION, SOLUTION INFILTRATION; PERINEURAL
Status: COMPLETED | OUTPATIENT
Start: 2024-05-24 | End: 2024-05-24

## 2024-05-24 RX ORDER — METHYLPREDNISOLONE ACETATE 40 MG/ML
40 INJECTION, SUSPENSION INTRA-ARTICULAR; INTRALESIONAL; INTRAMUSCULAR; SOFT TISSUE
Status: COMPLETED | OUTPATIENT
Start: 2024-05-24 | End: 2024-05-24

## 2024-05-24 RX ADMIN — LIDOCAINE HYDROCHLORIDE 2 ML: 20 INJECTION, SOLUTION INFILTRATION; PERINEURAL at 10:36

## 2024-05-24 RX ADMIN — METHYLPREDNISOLONE ACETATE 40 MG: 40 INJECTION, SUSPENSION INTRA-ARTICULAR; INTRALESIONAL; INTRAMUSCULAR; SOFT TISSUE at 10:36

## 2024-05-24 ASSESSMENT — PAIN - FUNCTIONAL ASSESSMENT: PAIN_FUNCTIONAL_ASSESSMENT: 0-10

## 2024-05-24 ASSESSMENT — PAIN DESCRIPTION - DESCRIPTORS: DESCRIPTORS: ACHING;THROBBING;SHARP;SHOOTING

## 2024-05-24 ASSESSMENT — PAIN SCALES - GENERAL: PAINLEVEL_OUTOF10: 10 - WORST POSSIBLE PAIN

## 2024-05-24 NOTE — PROGRESS NOTES
Chief complaint is several weeks of left knee pain.  She has been treated with physical therapy no medication  No fevers or chills she is walking with a cane  No prior problems  Past medical,family and social histories have been reviewed and are up to date.  All other body systems have been reviewed and are negative for complaint.  Constitutional: Well-developed well-nourished   Eyes: Sclerae anicteric, pupils equal and round  HENT: Normocephalic atraumatic  Cardiovascular: Pulses full, regular rate and rhythm  Respiratory: Breathing not labored, no wheezing  Integumentary: Skin intact, no lesions or rashes  Neurological: Sensation intact, no gross strength deficits, reflexes equal  Psychiatric: Alert oriented and appropriate  Hematologic/lymphatic: No lymphadenopathy  Left knee small effusion generalized tenderness mild crepitus mobility preserved  X-ray report shows mild to moderate arthritic change assessment knee arthritis effusion injected knee discussed management of knee arthritis natural history of arthritis discussed indications for knee replacement surgery  All questions answered  L Inj/Asp: L knee on 5/24/2024 10:36 AM  Indications: pain  Details: 21 G needle, lateral approach  Medications: 40 mg methylPREDNISolone acetate 40 mg/mL; 2 mL lidocaine 20 mg/mL (2 %)

## 2024-05-25 ENCOUNTER — HOME CARE VISIT (OUTPATIENT)
Dept: HOME HEALTH SERVICES | Facility: HOME HEALTH | Age: 64
End: 2024-05-25
Payer: COMMERCIAL

## 2024-05-29 ASSESSMENT — ACTIVITIES OF DAILY LIVING (ADL)
WASHING_UPB_CURRENT_FUNCTION: STAND BY ASSIST
DRESSING_LB_CURRENT_FUNCTION: STAND BY ASSIST
WASHING_LB_CURRENT_FUNCTION: STAND BY ASSIST
BATHING_CURRENT_FUNCTION: ONE PERSON
BATHING ASSESSED: 1
BATHING_CURRENT_FUNCTION: STAND BY ASSIST
PREPARING MEALS: NEEDS ASSISTANCE
OASIS_M1830: 02
DRESSING_UB_CURRENT_FUNCTION: STAND BY ASSIST
HOME_HEALTH_OASIS: 01
FEEDING_WITHIN_DEFINED_LIMITS: 1
AMBULATION ASSISTANCE: 1
GROOMING_CURRENT_FUNCTION: STAND BY ASSIST
AMBULATION ASSISTANCE: ONE PERSON
GROOMING ASSESSED: 1
AMBULATION ASSISTANCE: STAND BY ASSIST

## 2024-05-29 ASSESSMENT — ENCOUNTER SYMPTOMS
PAIN LOCATION - PAIN DURATION: 1 TO 2 HOURS
HIGHEST PAIN SEVERITY IN PAST 24 HOURS: 7/10
AGGRESSION WITHIN DEFINED LIMITS: 1
PAIN LOCATION - PAIN SEVERITY: 7/10
SUBJECTIVE PAIN PROGRESSION: GRADUALLY IMPROVING
ANGER WITHIN DEFINED LIMITS: 1
PERSON REPORTING PAIN: PATIENT
PAIN LOCATION - PAIN QUALITY: SHARP
PAIN LOCATION - PAIN FREQUENCY: FREQUENT
LOWEST PAIN SEVERITY IN PAST 24 HOURS: 6/10
PAIN: 1
PAIN LOCATION - RELIEVING FACTORS: UNLOADING
PAIN LOCATION - EXACERBATING FACTORS: LEFT KNEE PAIN
PAIN SEVERITY GOAL: 0/10
PAIN LOCATION: LEFT KNEE

## 2024-05-29 ASSESSMENT — PAIN SCALES - PAIN ASSESSMENT IN ADVANCED DEMENTIA (PAINAD)
BREATHING: 1
FACIALEXPRESSION: 0
NEGVOCALIZATION: 0
BODYLANGUAGE: 0
CONSOLABILITY: 0 - NO NEED TO CONSOLE.
TOTALSCORE: 1
BODYLANGUAGE: 0 - RELAXED.
CONSOLABILITY: 0
NEGVOCALIZATION: 0 - NONE.
FACIALEXPRESSION: 0 - SMILING OR INEXPRESSIVE.

## 2024-05-29 NOTE — CASE COMMUNICATION
HOME CARE COMMUNICATION NOTE - UNABLE TO LOCATE PATIENT FOR LAST TWO OCCUPATIONAL THERAPY VISITS, AND 4 OF FINAL 6 VISIT TO COMPLETE PLAN OF CARE. AGENCY DISCHARGE COMPLETED.

## 2024-06-06 ENCOUNTER — TELEPHONE (OUTPATIENT)
Dept: PEDIATRICS | Facility: CLINIC | Age: 64
End: 2024-06-06
Payer: MEDICAID

## 2024-06-19 ENCOUNTER — CLINICAL SUPPORT (OUTPATIENT)
Dept: NUTRITION | Facility: HOSPITAL | Age: 64
End: 2024-06-19
Payer: COMMERCIAL

## 2024-06-19 NOTE — PROGRESS NOTES
[facility]  Diet Recommendation 1: Diabetes  Diet Recommendation 2: Heart Healthy  Diet Recommendation 3: Healthy Eating  Food Intolerance Avoidance: NKFA  Household Size: 4 Members (Max/[place])  Interventions: Referral Number: 3rd 6 Mo Referral 1.5 yrs (Referrals may not be consecutive)  Interventions: Visit Number: 2 of 6 Visits - Max 6 Visits/Referral Each 6 Mo Period  Education Today: [employer]  Follow Up Notes for Future Visits: Got summer lunch program info. Reminded pt she needs new referral  Grains: 0-25% Whole  Fruit: 25-50% Fresh  Vegetables: 0-25% Fresh  Proteins: 1-2 Plant-based Items  Dairy: 0-25% Lowfat  Originating Site of Referral Order: [facility]  Initials of RD Assisting Today: [place]

## 2024-06-21 ENCOUNTER — OFFICE VISIT (OUTPATIENT)
Dept: PRIMARY CARE | Facility: CLINIC | Age: 64
End: 2024-06-21
Payer: COMMERCIAL

## 2024-06-21 VITALS
BODY MASS INDEX: 37.45 KG/M2 | RESPIRATION RATE: 18 BRPM | TEMPERATURE: 98.2 F | WEIGHT: 233 LBS | DIASTOLIC BLOOD PRESSURE: 73 MMHG | OXYGEN SATURATION: 97 % | SYSTOLIC BLOOD PRESSURE: 103 MMHG | HEIGHT: 66 IN | HEART RATE: 76 BPM

## 2024-06-21 DIAGNOSIS — Z59.41 FOOD INSECURITY: ICD-10-CM

## 2024-06-21 DIAGNOSIS — E11.9 DIABETES MELLITUS TYPE 2 WITHOUT RETINOPATHY (MULTI): Primary | ICD-10-CM

## 2024-06-21 DIAGNOSIS — M25.562 ACUTE PAIN OF LEFT KNEE: ICD-10-CM

## 2024-06-21 LAB — POC FINGERSTICK BLOOD GLUCOSE: 119 MG/DL (ref 70–100)

## 2024-06-21 PROCEDURE — 82962 GLUCOSE BLOOD TEST: CPT | Performed by: STUDENT IN AN ORGANIZED HEALTH CARE EDUCATION/TRAINING PROGRAM

## 2024-06-21 PROCEDURE — 4010F ACE/ARB THERAPY RXD/TAKEN: CPT | Performed by: STUDENT IN AN ORGANIZED HEALTH CARE EDUCATION/TRAINING PROGRAM

## 2024-06-21 PROCEDURE — 3048F LDL-C <100 MG/DL: CPT | Performed by: STUDENT IN AN ORGANIZED HEALTH CARE EDUCATION/TRAINING PROGRAM

## 2024-06-21 PROCEDURE — 1036F TOBACCO NON-USER: CPT | Performed by: STUDENT IN AN ORGANIZED HEALTH CARE EDUCATION/TRAINING PROGRAM

## 2024-06-21 PROCEDURE — 99214 OFFICE O/P EST MOD 30 MIN: CPT | Performed by: STUDENT IN AN ORGANIZED HEALTH CARE EDUCATION/TRAINING PROGRAM

## 2024-06-21 PROCEDURE — 3078F DIAST BP <80 MM HG: CPT | Performed by: STUDENT IN AN ORGANIZED HEALTH CARE EDUCATION/TRAINING PROGRAM

## 2024-06-21 PROCEDURE — 3074F SYST BP LT 130 MM HG: CPT | Performed by: STUDENT IN AN ORGANIZED HEALTH CARE EDUCATION/TRAINING PROGRAM

## 2024-06-21 PROCEDURE — 3052F HG A1C>EQUAL 8.0%<EQUAL 9.0%: CPT | Performed by: STUDENT IN AN ORGANIZED HEALTH CARE EDUCATION/TRAINING PROGRAM

## 2024-06-21 PROCEDURE — 1125F AMNT PAIN NOTED PAIN PRSNT: CPT | Performed by: STUDENT IN AN ORGANIZED HEALTH CARE EDUCATION/TRAINING PROGRAM

## 2024-06-21 RX ORDER — TRAMADOL HYDROCHLORIDE 50 MG/1
TABLET ORAL
COMMUNITY
Start: 2024-06-03

## 2024-06-21 RX ORDER — IBUPROFEN 800 MG/1
TABLET ORAL
COMMUNITY
Start: 2024-06-03

## 2024-06-21 RX ORDER — PREDNISONE 20 MG/1
20 TABLET ORAL DAILY
Qty: 5 TABLET | Refills: 0 | Status: SHIPPED | OUTPATIENT
Start: 2024-06-21 | End: 2024-12-18

## 2024-06-21 RX ORDER — DEXTROMETHORPHAN HYDROBROMIDE, GUAIFENESIN 5; 100 MG/5ML; MG/5ML
650 LIQUID ORAL EVERY 8 HOURS PRN
Qty: 90 TABLET | Refills: 1 | Status: SHIPPED | OUTPATIENT
Start: 2024-06-21 | End: 2024-08-20

## 2024-06-21 SDOH — ECONOMIC STABILITY - FOOD INSECURITY: FOOD INSECURITY: Z59.41

## 2024-06-21 ASSESSMENT — ENCOUNTER SYMPTOMS
OCCASIONAL FEELINGS OF UNSTEADINESS: 1
DEPRESSION: 1
LOSS OF SENSATION IN FEET: 0

## 2024-06-21 ASSESSMENT — PATIENT HEALTH QUESTIONNAIRE - PHQ9
8. MOVING OR SPEAKING SO SLOWLY THAT OTHER PEOPLE COULD HAVE NOTICED. OR THE OPPOSITE, BEING SO FIGETY OR RESTLESS THAT YOU HAVE BEEN MOVING AROUND A LOT MORE THAN USUAL: NOT AT ALL
4. FEELING TIRED OR HAVING LITTLE ENERGY: NEARLY EVERY DAY
9. THOUGHTS THAT YOU WOULD BE BETTER OFF DEAD, OR OF HURTING YOURSELF: NOT AT ALL
7. TROUBLE CONCENTRATING ON THINGS, SUCH AS READING THE NEWSPAPER OR WATCHING TELEVISION: NOT AT ALL
2. FEELING DOWN, DEPRESSED OR HOPELESS: NEARLY EVERY DAY
3. TROUBLE FALLING OR STAYING ASLEEP OR SLEEPING TOO MUCH: NOT AT ALL
5. POOR APPETITE OR OVEREATING: NEARLY EVERY DAY
SUM OF ALL RESPONSES TO PHQ9 QUESTIONS 1 AND 2: 6
6. FEELING BAD ABOUT YOURSELF - OR THAT YOU ARE A FAILURE OR HAVE LET YOURSELF OR YOUR FAMILY DOWN: NOT AT ALL
1. LITTLE INTEREST OR PLEASURE IN DOING THINGS: NEARLY EVERY DAY
SUM OF ALL RESPONSES TO PHQ QUESTIONS 1-9: 12

## 2024-06-21 ASSESSMENT — PAIN SCALES - GENERAL: PAINLEVEL: 10-WORST PAIN EVER

## 2024-06-21 NOTE — PROGRESS NOTES
"  Subjective   Patient ID: Dot Sevilla is a 63 y.o. female who presents for Leg Swelling (4 months) and Med Refill (Depends, Trulicity(frozen), atenolol-chlorthalidone, cetirizine, colchicine, famotidine, montelukast, trazodone, fluticasone, dextro/guaf (robitussin), freestyle lite test strips).       1. Left knee/leg pain  4 months duration  Started suddenly upon awakening one day  Went to [facility] ED, got XR, showing OA and ehthesophytes w/o fracture, dislocation  Persisted since then  Using crutch to ambulate  Starting to ache in her right knee as she compensates  Sometimes feels warm to the touch  Denies fevers, though does have random hot flashes postmenopausal  Household members do smoke around her  No long trips, no immobilization  No estrogen-containing medications  Modest relief with CCS injection of left knee with Ortho    A/  Right calf 44 cm, left calf 45 cm (measuring 10 cm inferior to tibial tuberosity)  A/P ROM significantly limited by pain  Moderate TTP of the left lateral knee joint  Moderate TTP of the distal left IT band   Wells Criteria score 0, unlikely DVT    P/  [ ] MRI left knee w/wo contrast for investigation of ligamentous tear  [ ] patient to trial home colchicine  [ ] sent in 5 day prednisone burst if colchicine ineffective  [ ] consider uric acid testing if above ineffective  [ ] APAP 650 mg Q8 hours    Med Refill             Review of Systems    Objective   /73 (BP Location: Left arm, Patient Position: Sitting, BP Cuff Size: Large adult)   Pulse 76   Temp 36.8 °C (98.2 °F) (Oral)   Resp 18   Ht 1.676 m (5' 6\")   Wt 106 kg (233 lb)   SpO2 97%   BMI 37.61 kg/m²     Physical Exam  Vitals reviewed.   Constitutional:       Appearance: Normal appearance. She is obese.   HENT:      Head: Normocephalic and atraumatic.      Mouth/Throat:      Mouth: Mucous membranes are moist.      Pharynx: Oropharynx is clear.   Eyes:      Extraocular Movements: Extraocular movements intact.     "  Conjunctiva/sclera: Conjunctivae normal.      Pupils: Pupils are equal, round, and reactive to light.   Cardiovascular:      Rate and Rhythm: Normal rate and regular rhythm.      Pulses: Normal pulses.      Heart sounds: Normal heart sounds.   Pulmonary:      Effort: Pulmonary effort is normal.      Breath sounds: Normal breath sounds.   Abdominal:      General: Abdomen is flat. Bowel sounds are normal.      Palpations: Abdomen is soft.   Musculoskeletal:         General: Normal range of motion.      Cervical back: Normal range of motion and neck supple.        Legs:       Comments: Right calf 44 cm, left calf 45 cm (measuring 10 cm inferior to tibial tuberosity)  A/P ROM significantly limited by pain  Moderate TTP of the left lateral knee joint  Moderate TTP of the distal left IT band   Skin:     General: Skin is warm and dry.      Capillary Refill: Capillary refill takes less than 2 seconds.   Neurological:      General: No focal deficit present.      Mental Status: She is alert.   Psychiatric:         Mood and Affect: Mood normal.         Behavior: Behavior normal.         Assessment/Plan   Problem List Items Addressed This Visit             ICD-10-CM    Diabetes mellitus type 2 without retinopathy (Multi) - Primary E11.9    Relevant Orders    POCT Fingerstick Glucose manually resulted (Completed)     Other Visit Diagnoses         Codes    Food insecurity     Z59.41    Relevant Orders    Referral to [facility]    Acute pain of left knee     M25.562    Relevant Medications    predniSONE (Deltasone) 20 mg tablet    acetaminophen (Tylenol 8 HOUR) 650 mg ER tablet    Other Relevant Orders    MR knee left w and wo IV contrast                       OVERALL PLAN:  [ ] MRI left knee w/wo contrast for investigation of ligamentous tear  [ ] patient to trial home colchicine  [ ] sent in 5 day prednisone burst if colchicine ineffective  [ ] consider uric acid testing if above ineffective  [ ] APAP 650 mg Q8 hours  [ ]  [facility] referral    DDX:  - less likely gout, DVT  - more likely OA in setting of MOB, possible tendon/ligament injury      Note: This documentaion was entered into the electronic medical record using [employer] medical dictation software, and was not necessarily edited thereafter. Please excuse any errors of spelling or grammar.

## 2024-06-25 ENCOUNTER — TELEPHONE (OUTPATIENT)
Dept: PRIMARY CARE | Facility: CLINIC | Age: 64
End: 2024-06-25

## 2024-06-27 DIAGNOSIS — M10.9 GOUT, UNSPECIFIED CAUSE, UNSPECIFIED CHRONICITY, UNSPECIFIED SITE: ICD-10-CM

## 2024-06-27 RX ORDER — COLCHICINE 0.6 MG/1
0.6 TABLET ORAL 2 TIMES DAILY
Qty: 90 TABLET | Refills: 3 | Status: SHIPPED | OUTPATIENT
Start: 2024-06-27

## 2024-07-18 ENCOUNTER — HOSPITAL ENCOUNTER (OUTPATIENT)
Dept: RADIOLOGY | Facility: CLINIC | Age: 64
Discharge: HOME | End: 2024-07-18
Payer: COMMERCIAL

## 2024-07-18 DIAGNOSIS — M25.562 ACUTE PAIN OF LEFT KNEE: ICD-10-CM

## 2024-07-18 PROCEDURE — 73721 MRI JNT OF LWR EXTRE W/O DYE: CPT | Mod: LT

## 2024-07-23 ENCOUNTER — APPOINTMENT (OUTPATIENT)
Dept: PRIMARY CARE | Facility: CLINIC | Age: 64
End: 2024-07-23
Payer: COMMERCIAL

## 2024-07-23 VITALS
TEMPERATURE: 97.7 F | HEART RATE: 85 BPM | WEIGHT: 231 LBS | RESPIRATION RATE: 18 BRPM | BODY MASS INDEX: 37.12 KG/M2 | DIASTOLIC BLOOD PRESSURE: 78 MMHG | SYSTOLIC BLOOD PRESSURE: 123 MMHG | OXYGEN SATURATION: 97 % | HEIGHT: 66 IN

## 2024-07-23 DIAGNOSIS — Z76.89 ESTABLISHING CARE WITH NEW DOCTOR, ENCOUNTER FOR: ICD-10-CM

## 2024-07-23 DIAGNOSIS — Z12.31 ENCOUNTER FOR SCREENING MAMMOGRAM FOR MALIGNANT NEOPLASM OF BREAST: ICD-10-CM

## 2024-07-23 DIAGNOSIS — S83.272A COMPLEX TEAR OF LATERAL MENISCUS OF LEFT KNEE AS CURRENT INJURY, INITIAL ENCOUNTER: ICD-10-CM

## 2024-07-23 DIAGNOSIS — S83.272A COMPLEX TEAR OF LATERAL MENISCUS OF LEFT KNEE AS CURRENT INJURY, INITIAL ENCOUNTER: Primary | ICD-10-CM

## 2024-07-23 PROCEDURE — 99213 OFFICE O/P EST LOW 20 MIN: CPT

## 2024-07-23 PROCEDURE — 3078F DIAST BP <80 MM HG: CPT

## 2024-07-23 PROCEDURE — 3008F BODY MASS INDEX DOCD: CPT

## 2024-07-23 PROCEDURE — 1159F MED LIST DOCD IN RCRD: CPT

## 2024-07-23 PROCEDURE — 3074F SYST BP LT 130 MM HG: CPT

## 2024-07-23 PROCEDURE — 3052F HG A1C>EQUAL 8.0%<EQUAL 9.0%: CPT

## 2024-07-23 PROCEDURE — 1125F AMNT PAIN NOTED PAIN PRSNT: CPT

## 2024-07-23 PROCEDURE — 4010F ACE/ARB THERAPY RXD/TAKEN: CPT

## 2024-07-23 PROCEDURE — 3048F LDL-C <100 MG/DL: CPT

## 2024-07-23 PROCEDURE — 1036F TOBACCO NON-USER: CPT

## 2024-07-23 RX ORDER — DICLOFENAC SODIUM 10 MG/G
4 GEL TOPICAL 4 TIMES DAILY
Qty: 100 G | Refills: 2 | Status: SHIPPED | OUTPATIENT
Start: 2024-07-23

## 2024-07-23 ASSESSMENT — PAIN SCALES - GENERAL: PAINLEVEL: 10-WORST PAIN EVER

## 2024-07-23 ASSESSMENT — COLUMBIA-SUICIDE SEVERITY RATING SCALE - C-SSRS
6. HAVE YOU EVER DONE ANYTHING, STARTED TO DO ANYTHING, OR PREPARED TO DO ANYTHING TO END YOUR LIFE?: NO
2. HAVE YOU ACTUALLY HAD ANY THOUGHTS OF KILLING YOURSELF?: NO
1. IN THE PAST MONTH, HAVE YOU WISHED YOU WERE DEAD OR WISHED YOU COULD GO TO SLEEP AND NOT WAKE UP?: NO

## 2024-07-23 ASSESSMENT — PATIENT HEALTH QUESTIONNAIRE - PHQ9
1. LITTLE INTEREST OR PLEASURE IN DOING THINGS: NOT AT ALL
2. FEELING DOWN, DEPRESSED OR HOPELESS: NOT AT ALL
SUM OF ALL RESPONSES TO PHQ9 QUESTIONS 1 AND 2: 0

## 2024-07-23 ASSESSMENT — ENCOUNTER SYMPTOMS
LOSS OF SENSATION IN FEET: 0
OCCASIONAL FEELINGS OF UNSTEADINESS: 1
DEPRESSION: 0

## 2024-07-23 NOTE — PATIENT INSTRUCTIONS
It was so great to see you today Dot Sevilla  . Today we discussed:    -Diclofenac gel 4x per day & Ice   -Ordered Quad cane  -Due for Initial Medicare Visit    Call [phone]  For Care if you need to schedule appointment with specialist or images.  IF any labs were ordered today, I will CALL if labs are ABNORMAL. If labs are NORMAL then I will comment on MyChart and mail results to you.    Follow up in       You were see by:    Dr. Steffi Hilario D.O.  [facility]   Phone: [phone]

## 2024-07-24 ENCOUNTER — HOSPITAL ENCOUNTER (OUTPATIENT)
Dept: RADIOLOGY | Facility: CLINIC | Age: 64
End: 2024-07-24
Payer: COMMERCIAL

## 2024-07-24 DIAGNOSIS — M25.562 ACUTE PAIN OF LEFT KNEE: ICD-10-CM

## 2024-07-25 ENCOUNTER — CLINICAL SUPPORT (OUTPATIENT)
Dept: NUTRITION | Facility: HOSPITAL | Age: 64
End: 2024-07-25
Payer: COMMERCIAL

## 2024-07-25 DIAGNOSIS — Z59.41 FOOD INSECURITY: ICD-10-CM

## 2024-07-25 RX ORDER — PREDNISONE 20 MG/1
20 TABLET ORAL DAILY
Qty: 5 TABLET | Refills: 0 | OUTPATIENT
Start: 2024-07-25 | End: 2025-01-21

## 2024-07-25 SDOH — ECONOMIC STABILITY - FOOD INSECURITY: FOOD INSECURITY: Z59.41

## 2024-08-02 ENCOUNTER — OFFICE VISIT (OUTPATIENT)
Dept: ORTHOPEDIC SURGERY | Facility: CLINIC | Age: 64
End: 2024-08-02
Payer: COMMERCIAL

## 2024-08-02 VITALS — HEIGHT: 66 IN | WEIGHT: 225 LBS | BODY MASS INDEX: 36.16 KG/M2

## 2024-08-02 DIAGNOSIS — S83.272A COMPLEX TEAR OF LATERAL MENISCUS OF LEFT KNEE AS CURRENT INJURY, INITIAL ENCOUNTER: ICD-10-CM

## 2024-08-02 PROCEDURE — 3052F HG A1C>EQUAL 8.0%<EQUAL 9.0%: CPT | Performed by: ORTHOPAEDIC SURGERY

## 2024-08-02 PROCEDURE — 1159F MED LIST DOCD IN RCRD: CPT | Performed by: ORTHOPAEDIC SURGERY

## 2024-08-02 PROCEDURE — 3008F BODY MASS INDEX DOCD: CPT | Performed by: ORTHOPAEDIC SURGERY

## 2024-08-02 PROCEDURE — 3048F LDL-C <100 MG/DL: CPT | Performed by: ORTHOPAEDIC SURGERY

## 2024-08-02 PROCEDURE — 99214 OFFICE O/P EST MOD 30 MIN: CPT | Performed by: ORTHOPAEDIC SURGERY

## 2024-08-02 PROCEDURE — 4010F ACE/ARB THERAPY RXD/TAKEN: CPT | Performed by: ORTHOPAEDIC SURGERY

## 2024-08-02 PROCEDURE — 1036F TOBACCO NON-USER: CPT | Performed by: ORTHOPAEDIC SURGERY

## 2024-08-02 NOTE — PROGRESS NOTES
Patient presents in follow-up for left knee pain she had a recent MRI which shows a complex lateral meniscus tear and minor degenerative changes  She has pain at rest pain with activity she is try to modify her activities she did get an injection which provided temporary relief  She is limping  Past medical,family and social histories have been reviewed and are up to date.  All other body systems have been reviewed and are negative for complaint.  Constitutional: Well-developed well-nourished   Eyes: Sclerae anicteric, pupils equal and round  HENT: Normocephalic atraumatic  Cardiovascular: Pulses full, regular rate and rhythm  Respiratory: Breathing not labored, no wheezing  Integumentary: Skin intact, no lesions or rashes  Neurological: Sensation intact, no gross strength deficits, reflexes equal  Psychiatric: Alert oriented and appropriate  Hematologic/lymphatic: No lymphadenopathy  Left knee: No angular deformity tender to lateral joint line moderate effusion positive Cooper's no instability full range of motion MRI as described she has no significant joint space narrowing on the standing AP of her knee impression lateral meniscus tear minor arthritic change at this point recommend arthroscopic meniscectomy she understands the limitations and expectations and she wishes to proceed with

## 2024-08-06 ENCOUNTER — HOSPITAL ENCOUNTER (OUTPATIENT)
Dept: RADIOLOGY | Facility: HOSPITAL | Age: 64
Discharge: HOME | End: 2024-08-06
Payer: COMMERCIAL

## 2024-08-06 VITALS — HEIGHT: 66 IN | WEIGHT: 230 LBS | BODY MASS INDEX: 36.96 KG/M2

## 2024-08-06 DIAGNOSIS — Z12.31 ENCOUNTER FOR SCREENING MAMMOGRAM FOR MALIGNANT NEOPLASM OF BREAST: ICD-10-CM

## 2024-08-06 DIAGNOSIS — Z76.89 ESTABLISHING CARE WITH NEW DOCTOR, ENCOUNTER FOR: ICD-10-CM

## 2024-08-06 PROCEDURE — 77067 SCR MAMMO BI INCL CAD: CPT | Performed by: STUDENT IN AN ORGANIZED HEALTH CARE EDUCATION/TRAINING PROGRAM

## 2024-08-06 PROCEDURE — 77067 SCR MAMMO BI INCL CAD: CPT

## 2024-08-06 PROCEDURE — 77063 BREAST TOMOSYNTHESIS BI: CPT | Performed by: STUDENT IN AN ORGANIZED HEALTH CARE EDUCATION/TRAINING PROGRAM

## 2024-08-07 ENCOUNTER — TELEPHONE (OUTPATIENT)
Dept: ORTHOPEDIC SURGERY | Facility: CLINIC | Age: 64
End: 2024-08-07
Payer: COMMERCIAL

## 2024-08-07 DIAGNOSIS — S83.282S TEAR OF LATERAL MENISCUS OF LEFT KNEE, CURRENT, UNSPECIFIED TEAR TYPE, SEQUELA: ICD-10-CM

## 2024-08-07 DIAGNOSIS — S83.242S TEAR OF MEDIAL MENISCUS OF LEFT KNEE, CURRENT, UNSPECIFIED TEAR TYPE, SEQUELA: ICD-10-CM

## 2024-08-07 PROBLEM — S83.272A COMPLEX TEAR OF LATERAL MENISCUS OF LEFT KNEE AS CURRENT INJURY: Status: ACTIVE | Noted: 2024-08-02

## 2024-08-14 ENCOUNTER — CLINICAL SUPPORT (OUTPATIENT)
Dept: PREADMISSION TESTING | Facility: HOSPITAL | Age: 64
End: 2024-08-14
Payer: COMMERCIAL

## 2024-08-14 DIAGNOSIS — S83.242S TEAR OF MEDIAL MENISCUS OF LEFT KNEE, CURRENT, UNSPECIFIED TEAR TYPE, SEQUELA: ICD-10-CM

## 2024-08-14 DIAGNOSIS — S83.282S TEAR OF LATERAL MENISCUS OF LEFT KNEE, CURRENT, UNSPECIFIED TEAR TYPE, SEQUELA: ICD-10-CM

## 2024-08-20 ENCOUNTER — DOCUMENTATION (OUTPATIENT)
Dept: PREADMISSION TESTING | Facility: HOSPITAL | Age: 64
End: 2024-08-20

## 2024-08-20 ENCOUNTER — LAB (OUTPATIENT)
Dept: LAB | Facility: LAB | Age: 64
End: 2024-08-20
Payer: COMMERCIAL

## 2024-08-20 ENCOUNTER — PRE-ADMISSION TESTING (OUTPATIENT)
Dept: PREADMISSION TESTING | Facility: HOSPITAL | Age: 64
End: 2024-08-20
Payer: COMMERCIAL

## 2024-08-20 VITALS
RESPIRATION RATE: 18 BRPM | OXYGEN SATURATION: 97 % | HEIGHT: 66 IN | BODY MASS INDEX: 36.78 KG/M2 | TEMPERATURE: 97.7 F | DIASTOLIC BLOOD PRESSURE: 55 MMHG | SYSTOLIC BLOOD PRESSURE: 138 MMHG | WEIGHT: 228.84 LBS | HEART RATE: 80 BPM

## 2024-08-20 DIAGNOSIS — I10 BENIGN ESSENTIAL HYPERTENSION: ICD-10-CM

## 2024-08-20 DIAGNOSIS — E11.9 DIABETES MELLITUS TYPE 2 WITHOUT RETINOPATHY (MULTI): ICD-10-CM

## 2024-08-20 DIAGNOSIS — I10 BENIGN ESSENTIAL HYPERTENSION: Primary | ICD-10-CM

## 2024-08-20 LAB
ANION GAP SERPL CALC-SCNC: 13 MMOL/L (ref 10–20)
BASOPHILS # BLD AUTO: 0.04 X10*3/UL (ref 0–0.1)
BASOPHILS NFR BLD AUTO: 0.5 %
BUN SERPL-MCNC: 23 MG/DL (ref 6–23)
CALCIUM SERPL-MCNC: 9.2 MG/DL (ref 8.6–10.3)
CHLORIDE SERPL-SCNC: 104 MMOL/L (ref 98–107)
CO2 SERPL-SCNC: 27 MMOL/L (ref 21–32)
CREAT SERPL-MCNC: 1.56 MG/DL (ref 0.5–1.05)
EGFRCR SERPLBLD CKD-EPI 2021: 37 ML/MIN/1.73M*2
EOSINOPHIL # BLD AUTO: 0.42 X10*3/UL (ref 0–0.7)
EOSINOPHIL NFR BLD AUTO: 4.9 %
ERYTHROCYTE [DISTWIDTH] IN BLOOD BY AUTOMATED COUNT: 16.7 % (ref 11.5–14.5)
GLUCOSE SERPL-MCNC: 107 MG/DL (ref 74–99)
HCT VFR BLD AUTO: 37.5 % (ref 36–46)
HGB BLD-MCNC: 11.6 G/DL (ref 12–16)
IMM GRANULOCYTES # BLD AUTO: 0.02 X10*3/UL (ref 0–0.7)
IMM GRANULOCYTES NFR BLD AUTO: 0.2 % (ref 0–0.9)
LYMPHOCYTES # BLD AUTO: 2.87 X10*3/UL (ref 1.2–4.8)
LYMPHOCYTES NFR BLD AUTO: 33.7 %
MCH RBC QN AUTO: 24.9 PG (ref 26–34)
MCHC RBC AUTO-ENTMCNC: 30.9 G/DL (ref 32–36)
MCV RBC AUTO: 81 FL (ref 80–100)
MONOCYTES # BLD AUTO: 0.66 X10*3/UL (ref 0.1–1)
MONOCYTES NFR BLD AUTO: 7.7 %
NEUTROPHILS # BLD AUTO: 4.51 X10*3/UL (ref 1.2–7.7)
NEUTROPHILS NFR BLD AUTO: 53 %
NRBC BLD-RTO: 0 /100 WBCS (ref 0–0)
PLATELET # BLD AUTO: 321 X10*3/UL (ref 150–450)
POTASSIUM SERPL-SCNC: 4.8 MMOL/L (ref 3.5–5.3)
RBC # BLD AUTO: 4.65 X10*6/UL (ref 4–5.2)
SODIUM SERPL-SCNC: 139 MMOL/L (ref 136–145)
WBC # BLD AUTO: 8.5 X10*3/UL (ref 4.4–11.3)

## 2024-08-20 PROCEDURE — 80048 BASIC METABOLIC PNL TOTAL CA: CPT

## 2024-08-20 PROCEDURE — 36415 COLL VENOUS BLD VENIPUNCTURE: CPT

## 2024-08-20 PROCEDURE — 99204 OFFICE O/P NEW MOD 45 MIN: CPT | Performed by: NURSE PRACTITIONER

## 2024-08-20 PROCEDURE — 85025 COMPLETE CBC W/AUTO DIFF WBC: CPT

## 2024-08-20 PROCEDURE — 83036 HEMOGLOBIN GLYCOSYLATED A1C: CPT

## 2024-08-20 ASSESSMENT — ENCOUNTER SYMPTOMS
GASTROINTESTINAL NEGATIVE: 1
RESPIRATORY NEGATIVE: 1
ENDOCRINE NEGATIVE: 1
CONSTITUTIONAL NEGATIVE: 1
CARDIOVASCULAR NEGATIVE: 1
LIMITED RANGE OF MOTION: 1
NEUROLOGICAL NEGATIVE: 1
NECK NEGATIVE: 1
ARTHRALGIAS: 1

## 2024-08-20 NOTE — CPM/PAT NURSE NOTE
CPM/PAT Nurse Note      Name: Dot Sevilla (Dot Sevilla)  /Age: 1960/63 y.o.       Past Medical History:   Diagnosis Date    Asthma ([facility])     Cardiology follow-up encounter 2024    Marshall Hoang DO    Complex tear of lateral meniscus of left knee as current injury, initial encounter     Plan: Left Knee Arthroscopy; Lateral Meniscectomy 24    Depression with anxiety     Diabetic neuropathy (Multi)     GERD (gastroesophageal reflux disease)     Glaucoma suspect, both eyes     Gout     Hypertension     Insomnia     Obesity     Type 2 diabetes mellitus (Multi)     24 A1C 8.1%       Past Surgical History:   Procedure Laterality Date    CATARACT EXTRACTION Bilateral     HYSTERECTOMY      OOPHORECTOMY         Patient Sexual activity questions deferred to the physician.    Family History   Problem Relation Name Age of Onset    Other (diabetes mellitus) Mother      Hypertension Mother      Other (stroke syndrome) Mother      Uterine cancer Mother      Other (diabetes mellitus) Father      Heart disease Father      Other (stroke syndrome) Father      Throat cancer Sister      Breast cancer Daughter          ? age-younger than 40    Colon cancer Mother's Sister         Allergies   Allergen Reactions    Metronidazole Hives       Prior to Admission medications    Medication Sig Start Date End Date Taking? Authorizing Provider   acetaminophen (Tylenol 8 HOUR) 650 mg ER tablet Take 1 tablet (650 mg) by mouth every 8 hours if needed for mild pain (1 - 3). Do not crush, chew, or split. 24  Mk Lisa MD   albuterol 2.5 mg /3 mL (0.083 %) nebulizer solution Take 3 mL (2.5 mg) by nebulization every 6 hours if needed for wheezing. USE 1 UNIT DOSE IN NEBULIZER EVERY 4 HOURS AS NEEDED. 23   Joan Almaraz MD   albuterol 90 mcg/actuation inhaler Inhale 1-2 puffs every 6 hours if needed for wheezing or shortness of breath. 23   Joan Almaraz MD    atenoloL-chlorthalidone (Tenoretic) 50-25 mg tablet Take 1 tablet by mouth once daily. As directed 12/22/23   Joan Almaraz MD   atorvastatin (Lipitor) 40 mg tablet Take 1 tablet (40 mg) by mouth once daily. 4/25/24 4/25/25  Marshall Hoang,    cetirizine (ZyrTEC) 10 mg tablet Take 1 tablet (10 mg) by mouth once daily at bedtime.  Patient taking differently: Take 1 tablet (10 mg) by mouth once daily. 12/22/23   Joan Almaraz MD   colchicine 0.6 mg tablet Take 1 tablet (0.6 mg) by mouth 2 times a day. TAKE 1 TABLET 3 times daily PRN Gout flare up 6/27/24   Mk Lisa MD   dextromethorphan-guaifenesin (Robitussin DM)  mg/5 mL syrup Take 10 mL by mouth 3 times a day as needed for congestion or cough. TAKE 10 ML EVERY 4-6 HOURS AS NEEDED 1/7/22   Historical Provider, MD   diaper,brief,adult,disposable (Depend Silhouette Women L/XL) misc 1 each once daily as needed (as needed for urinary frequency). Use one each as needed for urinary frequency 4/26/24   Joan Almaraz MD   diclofenac sodium (Voltaren) 1 % gel Apply 4.5 inches (4 g) topically 4 times a day.  Patient not taking: Reported on 8/20/2024 7/23/24   Steffi Hilario,    dulaglutide (Trulicity) 1.5 mg/0.5 mL pen injector injection Inject 1.5 mg under the skin 1 (one) time per week.  Patient taking differently: Inject 1.5 mg under the skin 1 (one) time per week. Fridays 4/28/24   Joan Almaraz MD   famotidine (Pepcid) 20 mg tablet Take 1 tablet (20 mg) by mouth once daily as needed for heartburn. hearatburn 12/22/23   Joan Almaraz MD   flash glucose sensor (FREESTYLE NITA 2 SENSOR MISC) Test every day    Historical Provider, MD   fluticasone (Flonase) 50 mcg/actuation nasal spray Administer 1-2 sprays into each nostril once daily. 4/20/23   Lopez Huertas MD   FreeStyle Lite Strips strip 1 strip by subcutaneous (via wearable injector) route in the morning and 1 strip at noon and 1 strip in the evening and 1 strip before bedtime. Take before meals. TEST  DAILY AND AS NEEDED AS DIRECTED. 4/20/23   Lopez Huertas MD   furosemide (Lasix) 20 mg tablet Take 1 tablet (20 mg) by mouth once a day on Monday, Wednesday, and Friday. 4/26/24 4/26/25  Marshall Hoang DO   ibuprofen 800 mg tablet Take 0.5 tablets (400 mg) by mouth every 8 hours if needed for moderate pain (4 - 6). 6/3/24   Historical Provider, MD   lidocaine (LMX 4) 4 % cream Apply topically 4 times a day as needed for mild pain (1 - 3).  Patient not taking: Reported on 6/21/2024 4/23/24 4/23/25  Joan Almaraz MD   miscellaneous medical supply misc 1 each once daily. Quad cane for right knee meniscal tear 7/23/24   Steffi Hilario DO   montelukast (Singulair) 10 mg tablet Take 1 tablet (10 mg) by mouth once daily at bedtime. 12/22/23   Joan Almaraz MD   olmesartan (Benicar) 5 mg tablet Take 2 tablets (10 mg) by mouth once daily. 4/25/24 4/25/25  Marshall Hoang DO   Symbicort 160-4.5 mcg/actuation inhaler INHALE 2 PUFFS IN THE MORNING AND 2 PUFFS IN THE EVENING. RINSE MOUTH AFTER USE. 12/22/23   Joan Almaraz MD   traMADol (Ultram) 50 mg tablet Take by mouth if needed. 6/3/24   Historical Provider, MD   traZODone (Desyrel) 100 mg tablet Take 1 tablet (100 mg) by mouth once daily at bedtime.  Patient taking differently: Take 1 tablet (100 mg) by mouth once daily at bedtime. As needed 12/22/23   Joan Almaraz MD   predniSONE (Deltasone) 20 mg tablet Take 1 tablet (20 mg) by mouth once daily. 6/21/24 8/14/24  Mk Lisa MD        PAT ROS     DASI Risk Score    No data to display       Caprini DVT Assessment    No data to display       Modified Frailty Index    No data to display       CHADS2 Stroke Risk  Current as of today        N/A 3 to 100%: High Risk   2 to < 3%: Medium Risk   0 to < 2%: Low Risk     Last Change: N/A          This score determines the patient's risk of having a stroke if the patient has atrial fibrillation.        This score is not applicable to this patient. Components are not  calculated.          Revised Cardiac Risk Index    No data to display       Apfel Simplified Score    No data to display       Risk Analysis Index Results This Encounter    No data found in the last 1 encounters.       After Visit Summary (AVS) reviewed and patient verbalized good understanding of medications and NPO instructions.     Nurse Plan of Action:

## 2024-08-20 NOTE — PREPROCEDURE INSTRUCTIONS
Medication List            Accurate as of August 20, 2024  3:03 PM. Always use your most recent med list.                acetaminophen 650 mg ER tablet  Commonly known as: Tylenol 8 HOUR  Take 1 tablet (650 mg) by mouth every 8 hours if needed for mild pain (1 - 3). Do not crush, chew, or split.  Medication Adjustments for Surgery: Other (Comment)  Notes to patient: May take day of surgery if needed     * albuterol 2.5 mg /3 mL (0.083 %) nebulizer solution  Take 3 mL (2.5 mg) by nebulization every 6 hours if needed for wheezing. USE 1 UNIT DOSE IN NEBULIZER EVERY 4 HOURS AS NEEDED.  Medication Adjustments for Surgery: Other (Comment)  Notes to patient: May use if needed     * albuterol 90 mcg/actuation inhaler  Inhale 1-2 puffs every 6 hours if needed for wheezing or shortness of breath.  Medication Adjustments for Surgery: Other (Comment)  Notes to patient: May use if needed     atenoloL-chlorthalidone 50-25 mg tablet  Commonly known as: Tenoretic  Take 1 tablet by mouth once daily. As directed  Medication Adjustments for Surgery: Other (Comment)  Notes to patient: Take as prescribed     atorvastatin 40 mg tablet  Commonly known as: Lipitor  Take 1 tablet (40 mg) by mouth once daily.  Medication Adjustments for Surgery: Other (Comment)  Notes to patient: Take as prescribed     cetirizine 10 mg tablet  Commonly known as: ZyrTEC  Take 1 tablet (10 mg) by mouth once daily at bedtime.  Medication Adjustments for Surgery: Other (Comment)  Notes to patient: Take as prescribed     colchicine 0.6 mg tablet  Take 1 tablet (0.6 mg) by mouth 2 times a day. TAKE 1 TABLET 3 times daily PRN Gout flare up  Medication Adjustments for Surgery: Other (Comment)  Notes to patient: Take as prescribed     Depend Silhouette Women L/XL misc  Generic drug: diaper,brief,adult,disposable  1 each once daily as needed (as needed for urinary frequency). Use one each as needed for urinary frequency     dextromethorphan-guaifenesin  mg/5  mL oral liquid  Commonly known as: Robitussin DM  Medication Adjustments for Surgery: Stop 1 day before surgery     diclofenac sodium 1 % gel  Commonly known as: Voltaren  Apply 4.5 inches (4 g) topically 4 times a day.  Medication Adjustments for Surgery: Stop 1 day before surgery     famotidine 20 mg tablet  Commonly known as: Pepcid  Take 1 tablet (20 mg) by mouth once daily as needed for heartburn. hearatburn  Medication Adjustments for Surgery: Other (Comment)  Notes to patient: Take as prescribed     fluticasone 50 mcg/actuation nasal spray  Commonly known as: Flonase  Administer 1-2 sprays into each nostril once daily.  Medication Adjustments for Surgery: Other (Comment)  Notes to patient: Take as prescribed     FREESTYLE NITA 2 SENSOR MISC     FreeStyle Lite Strips strip  Generic drug: blood sugar diagnostic  1 strip by subcutaneous (via wearable injector) route in the morning and 1 strip at noon and 1 strip in the evening and 1 strip before bedtime. Take before meals. TEST DAILY AND AS NEEDED AS DIRECTED.     furosemide 20 mg tablet  Commonly known as: Lasix  Take 1 tablet (20 mg) by mouth once a day on Monday, Wednesday, and Friday.  Medication Adjustments for Surgery: Stop 1 day before surgery     ibuprofen 800 mg tablet  Medication Adjustments for Surgery: Stop 7 days before surgery     LMX 4 4 % cream  Generic drug: lidocaine  Apply topically 4 times a day as needed for mild pain (1 - 3).  Medication Adjustments for Surgery: Stop 1 day before surgery     miscellaneous medical supply [facility]  1 each once daily. Quad cane for right knee meniscal tear     montelukast 10 mg tablet  Commonly known as: Singulair  Take 1 tablet (10 mg) by mouth once daily at bedtime.  Medication Adjustments for Surgery: Other (Comment)  Notes to patient: Take as prescribed     olmesartan 5 mg tablet  Commonly known as: Benicar  Take 2 tablets (10 mg) by mouth once daily.  Medication Adjustments for Surgery: Other  (Comment)  Notes to patient: Do NOT take evening before surgery or on the morning of surgery. Must stop 24 hours prior to surgery.     Symbicort 160-4.5 mcg/actuation inhaler  Generic drug: budesonide-formoteroL  INHALE 2 PUFFS IN THE MORNING AND 2 PUFFS IN THE EVENING. RINSE MOUTH AFTER USE.  Medication Adjustments for Surgery: Other (Comment)  Notes to patient: Take as prescribed     traMADol 50 mg tablet  Commonly known as: Ultram  Medication Adjustments for Surgery: Other (Comment)  Notes to patient: Take as prescribed     traZODone 100 mg tablet  Commonly known as: Desyrel  Take 1 tablet (100 mg) by mouth once daily at bedtime.  Medication Adjustments for Surgery: Other (Comment)  Notes to patient: Take as prescribed     Trulicity 1.5 mg/0.5 mL pen injector injection  Generic drug: dulaglutide  Inject 1.5 mg under the skin 1 (one) time per week.  Medication Adjustments for Surgery: Stop 7 days before surgery  Notes to patient: Do NOT take 8/23/24 dose           * This list has 2 medication(s) that are the same as other medications prescribed for you. Read the directions carefully, and ask your doctor or other care provider to review them with you.                CONTACT SURGEON'S OFFICE IF YOU DEVELOP:  * Fever = 100.4 F   * New respiratory symptoms (e.g. cough, shortness of breath, respiratory distress, sore throat)  * Recent loss of taste or smell  *Flu like symptoms such as headache, fatigue or gastrointestinal symptoms  * You develop any open sores, shingles, burning or painful urination   AND/OR:  * You no longer wish to have the surgery.  * Any other personal circumstances change that may lead to the need to cancel or defer this surgery.  *You were admitted to any hospital within one week of your planned procedure.    SMOKING:  *Quitting smoking can make a huge difference to your health and recovery from surgery.    *If you need help with quitting, call 7-800-QUIT-NOW.    THE DAY BEFORE SURGERY:  *Do not  eat any food or drink any liquids after midnight the night before your surgery/procedure.  You may have sips of water to take medications.    *DIABETICS:    Please check fasting blood sugar upon waking up.  If fasting sugar is <80 mg/dl, please drink 100ml/3oz of apple juice no later than 2 hours prior to arrival time to hospital.    SURGICAL TIME:  *You will be contacted between 2 p.m. and 6 p.m. the business day before your surgery with your arrival time.  *If you haven't received a call by 6pm, call [phone].  *Scheduled surgery times may change and you will be notified if this occurs-check your personal voicemail for any updates.    ON THE MORNING OF SURGERY:  *Wear comfortable, loose fitting clothing.   *Do not use moisturizers, creams, lotions or perfume.  *All jewelry and valuables should be left at home.  *Prosthetic devices such as contact lenses, hearing aids, dentures, eyelash extensions, hairpins and body piercing must be removed before surgery.    BRING WITH YOU:  *Photo ID and insurance card  *Current list of medications and allergies  *Pacemaker/Defibrillator/Heart stent cards  *CPAP machine and mask  *Slings/splints/crutches  *Copy of your complete Advanced Directive/DHPOA-if applicable  *Neurostimulator implant remote    PARKING AND ARRIVAL:  *Check in at the Main Entrance desk and let them know you are here for surgery.  *You will be directed to the 2nd floor surgical waiting area.    IF YOU ARE HAVING OUTPATIENT/SAME DAY SURGERY:  *A responsible adult MUST accompany you at the time of discharge and stay with you for 24 hours after your surgery.  *You may NOT drive yourself home after surgery.  *You may use a taxi or ride sharing service ([employer], [employer]) to return home ONLY if you are accompanied by a friend or family member.  *Instructions for resuming your medications will be provided by your surgeon.

## 2024-08-20 NOTE — CPM/PAT H&P
[facility]/PAT Evaluation       Name: Dot Sevilla (Dot Sevilla)  /Age: 1960/63 y.o.     In-Person         Date of Consult: 24    Referring Provider:  Dr. Ctoter    Date, Surgery, and Length: 24, left knee arthroscopy with lateral meniscectomy, 75 minutes     Patient presents to [facility] for perioperative risk assessment prior to scheduled surgery. She presents with persistent left knee pain and MRI shows complex lateral meniscus repair and minor degenerative changes.      This note was created in part upon personal review of patient's medical records.      Pt denies any past history of anesthetic complications such as PONV, awareness, prolonged sedation, dental damage, aspiration, cardiac arrest, difficult intubation, difficult I.V. access or unexpected hospital admissions.  No history of malignant hyperthermia and or pseudocholinesterase deficiency.    No history of blood transfusions.    The patient is not a [employer] and will accept blood and blood products if medically indicated. Type and screen not sent.      Past Medical History:   Diagnosis Date    Asthma (HHS-HCC)     Cardiology follow-up encounter 2024    Marshall Hoang DO    Complex tear of lateral meniscus of left knee as current injury, initial encounter     Plan: Left Knee Arthroscopy; Lateral Meniscectomy 24    Depression with anxiety     Diabetic neuropathy (Multi)     GERD (gastroesophageal reflux disease)     Glaucoma suspect, both eyes     Gout     Hypertension     Insomnia     Obesity     Type 2 diabetes mellitus (Multi)     24 A1C 8.1%       Past Surgical History:   Procedure Laterality Date    CATARACT EXTRACTION Bilateral     HYSTERECTOMY  2005    OOPHORECTOMY       Family History   Problem Relation Name Age of Onset    Other (diabetes mellitus) Mother      Hypertension Mother      Other (stroke syndrome) Mother      Uterine cancer Mother      Other (diabetes mellitus) Father      Heart  disease Father      Other (stroke syndrome) Father      Throat cancer Sister      Breast cancer Daughter          ? age-younger than 40    Colon cancer Mother's Sister       Allergies   Allergen Reactions    Metronidazole Hives       Current Outpatient Medications:     acetaminophen (Tylenol 8 HOUR) 650 mg ER tablet, Take 1 tablet (650 mg) by mouth every 8 hours if needed for mild pain (1 - 3). Do not crush, chew, or split., Disp: 90 tablet, Rfl: 1    albuterol 2.5 mg /3 mL (0.083 %) nebulizer solution, Take 3 mL (2.5 mg) by nebulization every 6 hours if needed for wheezing. USE 1 UNIT DOSE IN NEBULIZER EVERY 4 HOURS AS NEEDED., Disp: 75 mL, Rfl: 11    albuterol 90 mcg/actuation inhaler, Inhale 1-2 puffs every 6 hours if needed for wheezing or shortness of breath., Disp: 18 g, Rfl: 11    atenoloL-chlorthalidone (Tenoretic) 50-25 mg tablet, Take 1 tablet by mouth once daily. As directed, Disp: 90 tablet, Rfl: 3    atorvastatin (Lipitor) 40 mg tablet, Take 1 tablet (40 mg) by mouth once daily., Disp: 90 tablet, Rfl: 3    cetirizine (ZyrTEC) 10 mg tablet, Take 1 tablet (10 mg) by mouth once daily at bedtime. (Patient taking differently: Take 1 tablet (10 mg) by mouth once daily.), Disp: 90 tablet, Rfl: 0    colchicine 0.6 mg tablet, Take 1 tablet (0.6 mg) by mouth 2 times a day. TAKE 1 TABLET 3 times daily PRN Gout flare up, Disp: 90 tablet, Rfl: 3    dextromethorphan-guaifenesin (Robitussin DM)  mg/5 mL syrup, Take 10 mL by mouth 3 times a day as needed for congestion or cough. TAKE 10 ML EVERY 4-6 HOURS AS NEEDED, Disp: , Rfl:     dulaglutide (Trulicity) 1.5 mg/0.5 mL pen injector injection, Inject 1.5 mg under the skin 1 (one) time per week. (Patient taking differently: Inject 1.5 mg under the skin 1 (one) time per week. Fridays), Disp: 2 mL, Rfl: 11    famotidine (Pepcid) 20 mg tablet, Take 1 tablet (20 mg) by mouth once daily as needed for heartburn. hearatburn, Disp: 90 tablet, Rfl: 3    fluticasone  (Flonase) 50 mcg/actuation nasal spray, Administer 1-2 sprays into each nostril once daily., Disp: 16 g, Rfl: 0    furosemide (Lasix) 20 mg tablet, Take 1 tablet (20 mg) by mouth once a day on Monday, Wednesday, and Friday., Disp: 36 tablet, Rfl: 3    ibuprofen 800 mg tablet, Take 0.5 tablets (400 mg) by mouth every 8 hours if needed for moderate pain (4 - 6)., Disp: , Rfl:     montelukast (Singulair) 10 mg tablet, Take 1 tablet (10 mg) by mouth once daily at bedtime., Disp: 90 tablet, Rfl: 1    olmesartan (Benicar) 5 mg tablet, Take 2 tablets (10 mg) by mouth once daily., Disp: 180 tablet, Rfl: 3    Symbicort 160-4.5 mcg/actuation inhaler, INHALE 2 PUFFS IN THE MORNING AND 2 PUFFS IN THE EVENING. RINSE MOUTH AFTER USE., Disp: 10.2 g, Rfl: 11    traMADol (Ultram) 50 mg tablet, Take by mouth if needed., Disp: , Rfl:     traZODone (Desyrel) 100 mg tablet, Take 1 tablet (100 mg) by mouth once daily at bedtime. (Patient taking differently: Take 1 tablet (100 mg) by mouth once daily at bedtime. As needed), Disp: 90 tablet, Rfl: 3    diaper,brief,adult,disposable (Depend Silhouette Women L/XL) misc, 1 each once daily as needed (as needed for urinary frequency). Use one each as needed for urinary frequency, Disp: 120 each, Rfl: 11    diclofenac sodium (Voltaren) 1 % gel, Apply 4.5 inches (4 g) topically 4 times a day. (Patient not taking: Reported on 8/20/2024), Disp: 100 g, Rfl: 2    flash glucose sensor (FREESTYLE NITA 2 SENSOR MISC), Test every day, Disp: , Rfl:     FreeStyle Lite Strips strip, 1 strip by subcutaneous (via wearable injector) route in the morning and 1 strip at noon and 1 strip in the evening and 1 strip before bedtime. Take before meals. TEST DAILY AND AS NEEDED AS DIRECTED., Disp: 200 strip, Rfl: 3    lidocaine (LMX 4) 4 % cream, Apply topically 4 times a day as needed for mild pain (1 - 3). (Patient not taking: Reported on 6/21/2024), Disp: 28 g, Rfl: 3    miscellaneous medical supply misc, 1 each  "once daily. Quad cane for right knee meniscal tear, Disp: 1 each, Rfl: 0      PAT ROS:   Constitutional:   neg    Neuro/Psych:   neg    Eyes:    use of corrective lenses  Ears:   neg    Nose:   neg    Mouth:    Upper/lower dentures  Throat:   neg    Neck:   neg    Cardio:   neg    Respiratory:   neg    Endocrine:   neg    GI:   neg    :   neg    Musculoskeletal:    arthralgias   decreased ROM  Hematologic:   neg    Skin:  neg        Physical Exam  Vitals reviewed. Physical exam within normal limits.          PAT AIRWAY:   Airway:     Mallampati::  II   edentulous      Visit Vitals  /55   Pulse 80   Temp 36.5 °C (97.7 °F)   Resp 18   Ht 1.676 m (5' 5.98\")   Wt 104 kg (228 lb 13.4 oz)   SpO2 97%   BMI 36.95 kg/m²   OB Status Hysterectomy   Smoking Status Never   BSA 2.2 m²     Assessment and Plan:     Patient is a 63 year old female scheduled for left knee arthroscopy with lateral meniscectomy with Dr. Moore on 8/26/24.    Patient is at acceptable risk to proceed with planned surgical procedure. Further cardiac risk stratification deferred at this time.This patient is an intermediate risk candidate undergoing moderate risk procedure, patient is medically optimized for surgery.    Plan    Neuro:    Depression with anxiety- continue current regimen    Chronic pain- continue current regimen    Cardiovascular:    Patient denies any chest pain, tightness, heaviness, pressure, radiating pain, palpitations, irregular heartbeats, lightheadedness, cough, congestion, shortness of breath, ROJAS, PND, near syncope, weight loss or gain.    Good functional capacity    EKG in PAT not indicated. Reviewed last EKG    Encounter Date: 04/25/24   ECG 12 lead (Clinic Performed)   Result Value    Ventricular Rate 75    Atrial Rate 75    LA Interval 202    QRS Duration 90    QT Interval 382    QTC Calculation(Bazett) 426    P Axis 3    R Axis 10    T Axis 2    QRS Count 12    Q Onset 226    P Onset 125    P Offset 179    T Offset " 417    QTC Fredericia 411    Narrative    Normal sinus rhythm  Normal ECG  When compared with ECG of 26-JAN-2024 11:57,  No significant change was found  Confirmed by Marshall Hoang ([id number]) on 5/1/2024 2:46:09 PM          RCRI: 0 Risk of Mace: 3.9%    HTN-controlled on current medication regimen. Continue atenolol-chlorthalidone. Will hold Lisinopril and Olmesartan DOS    HLD- continue statin    Last seen by cardiologist Dr. Marshall Hoang April 2024.    Echo February 2024:  CONCLUSIONS:   1. Left ventricular systolic function is normal with a 60-65% estimated ejection fraction.   2. Spectral Doppler shows an impaired relaxation pattern of left ventricular diastolic filling.    Stress test May 2023:  Summary:  1. The resting ejection fraction was estimated at 55 to 60% with a peak exercise ejection fraction estimated at 65 to 70%.  2. There is no evidence of inducible ischemia by stress echocardiography.  3. There is poor functional capacity with decreased exercise tolerance and evidence of exercise-induced pulmonary hypertension. Estimated RVSP increased from 14 mmHg at rest to 39 mmHg during peak stress.  4. No clinical, echocardiographic or electrocardiographic evidence for ischemia at a maximal workload.  5. The adequate level of stress was achieved.    Pulm:  Known or suspected SHAHRAM is considered an independent risk factor for difficult mask ventilation, difficult intubation or both.  Increased vigilance is recommended with the use of narcotics due to an increased risk for opioid induced respiratory depression.  The patient may benefit from continuous pulse oximetry to monitor for hypoxic events until baseline Sp02 is normal on room air.    Stop Bang= 3, age >50, HTN, obesity    Asthma- continue inhaler regimen    Renal/endo:  Recommendations to avoid nephrotoxic drugs and carefully monitor fluid status to maintain euvolemia. Use dose adjusted medications as needed for the underlying level of renal  function.    DM II- uncontrolled. Last A1C April 2024 8.1%. Patient to hold Trulicity 7 days prior to surgery.    Heme:  Patient instructed to ambulate as soon as possible postoperatively to decrease thromboembolic risk.    Initiate mechanical DVT prophylaxis as soon as possible and initiate chemical prophylaxis when deemed safe from a bleeding standpoint post surgery.    Caprini= 4      Risk assessment complete.  Patient is scheduled for an intermediate surgical risk procedure. She is considered medically optimized for the planned procedure.    Labs/testing obtained in PAT on 8/20/24: CBC, BMP, A1C    Lab Results   Component Value Date    WBC 8.5 08/20/2024    HGB 11.6 (L) 08/20/2024    HCT 37.5 08/20/2024    MCV 81 08/20/2024     08/20/2024     Lab Results   Component Value Date    GLUCOSE 107 (H) 08/20/2024    CALCIUM 9.2 08/20/2024     08/20/2024    K 4.8 08/20/2024    CO2 27 08/20/2024     08/20/2024    BUN 23 08/20/2024    CREATININE 1.56 (H) 08/20/2024     Follow up: none    Preoperative medication instructions were provided and reviewed with the patient.  Any additional testing or evaluation was explained to the patient.  Nothing by mouth instructions were discussed and patient's questions were answered prior to conclusion to this encounter.  Patient verbalized understanding of preoperative instructions given in preadmission testing; discharge instructions available in EMR.    This note was dictated with speech recognition.  Minor errors may have been detected during use of speech recognition.

## 2024-08-20 NOTE — H&P (VIEW-ONLY)
[facility]/PAT Evaluation       Name: Dot Sevilla (Dot Sevilla)  /Age: 1960/63 y.o.     In-Person         Date of Consult: 24    Referring Provider:  Dr. Cotter    Date, Surgery, and Length: 24, left knee arthroscopy with lateral meniscectomy, 75 minutes     Patient presents to [facility] for perioperative risk assessment prior to scheduled surgery. She presents with persistent left knee pain and MRI shows complex lateral meniscus repair and minor degenerative changes.      This note was created in part upon personal review of patient's medical records.      Pt denies any past history of anesthetic complications such as PONV, awareness, prolonged sedation, dental damage, aspiration, cardiac arrest, difficult intubation, difficult I.V. access or unexpected hospital admissions.  No history of malignant hyperthermia and or pseudocholinesterase deficiency.    No history of blood transfusions.    The patient is not a [employer] and will accept blood and blood products if medically indicated. Type and screen not sent.      Past Medical History:   Diagnosis Date    Asthma (HHS-HCC)     Cardiology follow-up encounter 2024    Marshall Hoang DO    Complex tear of lateral meniscus of left knee as current injury, initial encounter     Plan: Left Knee Arthroscopy; Lateral Meniscectomy 24    Depression with anxiety     Diabetic neuropathy (Multi)     GERD (gastroesophageal reflux disease)     Glaucoma suspect, both eyes     Gout     Hypertension     Insomnia     Obesity     Type 2 diabetes mellitus (Multi)     24 A1C 8.1%       Past Surgical History:   Procedure Laterality Date    CATARACT EXTRACTION Bilateral     HYSTERECTOMY  2005    OOPHORECTOMY       Family History   Problem Relation Name Age of Onset    Other (diabetes mellitus) Mother      Hypertension Mother      Other (stroke syndrome) Mother      Uterine cancer Mother      Other (diabetes mellitus) Father      Heart  disease Father      Other (stroke syndrome) Father      Throat cancer Sister      Breast cancer Daughter          ? age-younger than 40    Colon cancer Mother's Sister       Allergies   Allergen Reactions    Metronidazole Hives       Current Outpatient Medications:     acetaminophen (Tylenol 8 HOUR) 650 mg ER tablet, Take 1 tablet (650 mg) by mouth every 8 hours if needed for mild pain (1 - 3). Do not crush, chew, or split., Disp: 90 tablet, Rfl: 1    albuterol 2.5 mg /3 mL (0.083 %) nebulizer solution, Take 3 mL (2.5 mg) by nebulization every 6 hours if needed for wheezing. USE 1 UNIT DOSE IN NEBULIZER EVERY 4 HOURS AS NEEDED., Disp: 75 mL, Rfl: 11    albuterol 90 mcg/actuation inhaler, Inhale 1-2 puffs every 6 hours if needed for wheezing or shortness of breath., Disp: 18 g, Rfl: 11    atenoloL-chlorthalidone (Tenoretic) 50-25 mg tablet, Take 1 tablet by mouth once daily. As directed, Disp: 90 tablet, Rfl: 3    atorvastatin (Lipitor) 40 mg tablet, Take 1 tablet (40 mg) by mouth once daily., Disp: 90 tablet, Rfl: 3    cetirizine (ZyrTEC) 10 mg tablet, Take 1 tablet (10 mg) by mouth once daily at bedtime. (Patient taking differently: Take 1 tablet (10 mg) by mouth once daily.), Disp: 90 tablet, Rfl: 0    colchicine 0.6 mg tablet, Take 1 tablet (0.6 mg) by mouth 2 times a day. TAKE 1 TABLET 3 times daily PRN Gout flare up, Disp: 90 tablet, Rfl: 3    dextromethorphan-guaifenesin (Robitussin DM)  mg/5 mL syrup, Take 10 mL by mouth 3 times a day as needed for congestion or cough. TAKE 10 ML EVERY 4-6 HOURS AS NEEDED, Disp: , Rfl:     dulaglutide (Trulicity) 1.5 mg/0.5 mL pen injector injection, Inject 1.5 mg under the skin 1 (one) time per week. (Patient taking differently: Inject 1.5 mg under the skin 1 (one) time per week. Fridays), Disp: 2 mL, Rfl: 11    famotidine (Pepcid) 20 mg tablet, Take 1 tablet (20 mg) by mouth once daily as needed for heartburn. hearatburn, Disp: 90 tablet, Rfl: 3    fluticasone  (Flonase) 50 mcg/actuation nasal spray, Administer 1-2 sprays into each nostril once daily., Disp: 16 g, Rfl: 0    furosemide (Lasix) 20 mg tablet, Take 1 tablet (20 mg) by mouth once a day on Monday, Wednesday, and Friday., Disp: 36 tablet, Rfl: 3    ibuprofen 800 mg tablet, Take 0.5 tablets (400 mg) by mouth every 8 hours if needed for moderate pain (4 - 6)., Disp: , Rfl:     montelukast (Singulair) 10 mg tablet, Take 1 tablet (10 mg) by mouth once daily at bedtime., Disp: 90 tablet, Rfl: 1    olmesartan (Benicar) 5 mg tablet, Take 2 tablets (10 mg) by mouth once daily., Disp: 180 tablet, Rfl: 3    Symbicort 160-4.5 mcg/actuation inhaler, INHALE 2 PUFFS IN THE MORNING AND 2 PUFFS IN THE EVENING. RINSE MOUTH AFTER USE., Disp: 10.2 g, Rfl: 11    traMADol (Ultram) 50 mg tablet, Take by mouth if needed., Disp: , Rfl:     traZODone (Desyrel) 100 mg tablet, Take 1 tablet (100 mg) by mouth once daily at bedtime. (Patient taking differently: Take 1 tablet (100 mg) by mouth once daily at bedtime. As needed), Disp: 90 tablet, Rfl: 3    diaper,brief,adult,disposable (Depend Silhouette Women L/XL) misc, 1 each once daily as needed (as needed for urinary frequency). Use one each as needed for urinary frequency, Disp: 120 each, Rfl: 11    diclofenac sodium (Voltaren) 1 % gel, Apply 4.5 inches (4 g) topically 4 times a day. (Patient not taking: Reported on 8/20/2024), Disp: 100 g, Rfl: 2    flash glucose sensor (FREESTYLE NITA 2 SENSOR MISC), Test every day, Disp: , Rfl:     FreeStyle Lite Strips strip, 1 strip by subcutaneous (via wearable injector) route in the morning and 1 strip at noon and 1 strip in the evening and 1 strip before bedtime. Take before meals. TEST DAILY AND AS NEEDED AS DIRECTED., Disp: 200 strip, Rfl: 3    lidocaine (LMX 4) 4 % cream, Apply topically 4 times a day as needed for mild pain (1 - 3). (Patient not taking: Reported on 6/21/2024), Disp: 28 g, Rfl: 3    miscellaneous medical supply misc, 1 each  "once daily. Quad cane for right knee meniscal tear, Disp: 1 each, Rfl: 0      PAT ROS:   Constitutional:   neg    Neuro/Psych:   neg    Eyes:    use of corrective lenses  Ears:   neg    Nose:   neg    Mouth:    Upper/lower dentures  Throat:   neg    Neck:   neg    Cardio:   neg    Respiratory:   neg    Endocrine:   neg    GI:   neg    :   neg    Musculoskeletal:    arthralgias   decreased ROM  Hematologic:   neg    Skin:  neg        Physical Exam  Vitals reviewed. Physical exam within normal limits.          PAT AIRWAY:   Airway:     Mallampati::  II   edentulous      Visit Vitals  /55   Pulse 80   Temp 36.5 °C (97.7 °F)   Resp 18   Ht 1.676 m (5' 5.98\")   Wt 104 kg (228 lb 13.4 oz)   SpO2 97%   BMI 36.95 kg/m²   OB Status Hysterectomy   Smoking Status Never   BSA 2.2 m²     Assessment and Plan:     Patient is a 63 year old female scheduled for left knee arthroscopy with lateral meniscectomy with Dr. Moore on 8/26/24.    Patient is at acceptable risk to proceed with planned surgical procedure. Further cardiac risk stratification deferred at this time.This patient is an intermediate risk candidate undergoing moderate risk procedure, patient is medically optimized for surgery.    Plan    Neuro:    Depression with anxiety- continue current regimen    Chronic pain- continue current regimen    Cardiovascular:    Patient denies any chest pain, tightness, heaviness, pressure, radiating pain, palpitations, irregular heartbeats, lightheadedness, cough, congestion, shortness of breath, ROJAS, PND, near syncope, weight loss or gain.    Good functional capacity    EKG in PAT not indicated. Reviewed last EKG    Encounter Date: 04/25/24   ECG 12 lead (Clinic Performed)   Result Value    Ventricular Rate 75    Atrial Rate 75    WV Interval 202    QRS Duration 90    QT Interval 382    QTC Calculation(Bazett) 426    P Axis 3    R Axis 10    T Axis 2    QRS Count 12    Q Onset 226    P Onset 125    P Offset 179    T Offset " 417    QTC Fredericia 411    Narrative    Normal sinus rhythm  Normal ECG  When compared with ECG of 26-JAN-2024 11:57,  No significant change was found  Confirmed by Marshall Hoang ([id number]) on 5/1/2024 2:46:09 PM          RCRI: 0 Risk of Mace: 3.9%    HTN-controlled on current medication regimen. Continue atenolol-chlorthalidone. Will hold Lisinopril and Olmesartan DOS    HLD- continue statin    Last seen by cardiologist Dr. Marshall Hoang April 2024.    Echo February 2024:  CONCLUSIONS:   1. Left ventricular systolic function is normal with a 60-65% estimated ejection fraction.   2. Spectral Doppler shows an impaired relaxation pattern of left ventricular diastolic filling.    Stress test May 2023:  Summary:  1. The resting ejection fraction was estimated at 55 to 60% with a peak exercise ejection fraction estimated at 65 to 70%.  2. There is no evidence of inducible ischemia by stress echocardiography.  3. There is poor functional capacity with decreased exercise tolerance and evidence of exercise-induced pulmonary hypertension. Estimated RVSP increased from 14 mmHg at rest to 39 mmHg during peak stress.  4. No clinical, echocardiographic or electrocardiographic evidence for ischemia at a maximal workload.  5. The adequate level of stress was achieved.    Pulm:  Known or suspected SHAHRAM is considered an independent risk factor for difficult mask ventilation, difficult intubation or both.  Increased vigilance is recommended with the use of narcotics due to an increased risk for opioid induced respiratory depression.  The patient may benefit from continuous pulse oximetry to monitor for hypoxic events until baseline Sp02 is normal on room air.    Stop Bang= 3, age >50, HTN, obesity    Asthma- continue inhaler regimen    Renal/endo:  Recommendations to avoid nephrotoxic drugs and carefully monitor fluid status to maintain euvolemia. Use dose adjusted medications as needed for the underlying level of renal  function.    DM II- uncontrolled. Last A1C April 2024 8.1%. Patient to hold Trulicity 7 days prior to surgery.    Heme:  Patient instructed to ambulate as soon as possible postoperatively to decrease thromboembolic risk.    Initiate mechanical DVT prophylaxis as soon as possible and initiate chemical prophylaxis when deemed safe from a bleeding standpoint post surgery.    Caprini= 4      Risk assessment complete.  Patient is scheduled for an intermediate surgical risk procedure. She is considered medically optimized for the planned procedure.    Labs/testing obtained in PAT on 8/20/24: CBC, BMP, A1C    Lab Results   Component Value Date    WBC 8.5 08/20/2024    HGB 11.6 (L) 08/20/2024    HCT 37.5 08/20/2024    MCV 81 08/20/2024     08/20/2024     Lab Results   Component Value Date    GLUCOSE 107 (H) 08/20/2024    CALCIUM 9.2 08/20/2024     08/20/2024    K 4.8 08/20/2024    CO2 27 08/20/2024     08/20/2024    BUN 23 08/20/2024    CREATININE 1.56 (H) 08/20/2024     Follow up: none    Preoperative medication instructions were provided and reviewed with the patient.  Any additional testing or evaluation was explained to the patient.  Nothing by mouth instructions were discussed and patient's questions were answered prior to conclusion to this encounter.  Patient verbalized understanding of preoperative instructions given in preadmission testing; discharge instructions available in EMR.    This note was dictated with speech recognition.  Minor errors may have been detected during use of speech recognition.

## 2024-08-23 ENCOUNTER — CLINICAL SUPPORT (OUTPATIENT)
Dept: NUTRITION | Facility: HOSPITAL | Age: 64
End: 2024-08-23
Payer: COMMERCIAL

## 2024-08-23 LAB
EST. AVERAGE GLUCOSE BLD GHB EST-MCNC: 151 MG/DL
HBA1C MFR BLD: 6.9 %

## 2024-08-23 NOTE — PROGRESS NOTES
[employer]  Diet Recommendation 1: Diabetes  Diet Recommendation 2: Heart Healthy  Diet Recommendation 3: Healthy Eating  Food Intolerance Avoidance: NKFA  Household Size: 4 Members (Max/[place])  Interventions: Referral Number: 3rd 6 Mo Referral 1.5 yrs (Referrals may not be consecutive)  Interventions: Visit Number: 4 of 6 Visits - Max 6 Visits/Referral Each 6 Mo Period  Education Today: [employer]  Follow Up Notes for Future Visits: Proxy (daughter) came today  Grains: 50-75% Whole  Fruit: Fresh - 100%  Vegetables: 0-25% Fresh  Proteins: 4 or more Plant-based Items  [facility]iginating [facility]  Initials of RD Assisting Today: [place]

## 2024-08-25 ENCOUNTER — ANESTHESIA EVENT (OUTPATIENT)
Dept: OPERATING ROOM | Facility: HOSPITAL | Age: 64
End: 2024-08-25
Payer: COMMERCIAL

## 2024-08-26 ENCOUNTER — ANESTHESIA (OUTPATIENT)
Dept: OPERATING ROOM | Facility: HOSPITAL | Age: 64
End: 2024-08-26
Payer: COMMERCIAL

## 2024-08-26 ENCOUNTER — PHARMACY VISIT (OUTPATIENT)
Dept: PHARMACY | Facility: CLINIC | Age: 64
End: 2024-08-26
Payer: COMMERCIAL

## 2024-08-26 ENCOUNTER — HOSPITAL ENCOUNTER (OUTPATIENT)
Facility: HOSPITAL | Age: 64
Setting detail: OUTPATIENT SURGERY
Discharge: HOME | End: 2024-08-26
Attending: ORTHOPAEDIC SURGERY | Admitting: ORTHOPAEDIC SURGERY
Payer: COMMERCIAL

## 2024-08-26 VITALS
BODY MASS INDEX: 38.79 KG/M2 | OXYGEN SATURATION: 95 % | HEIGHT: 65 IN | SYSTOLIC BLOOD PRESSURE: 152 MMHG | TEMPERATURE: 96.8 F | RESPIRATION RATE: 18 BRPM | WEIGHT: 232.81 LBS | DIASTOLIC BLOOD PRESSURE: 69 MMHG | HEART RATE: 65 BPM

## 2024-08-26 DIAGNOSIS — G47.00 INSOMNIA, UNSPECIFIED TYPE: ICD-10-CM

## 2024-08-26 DIAGNOSIS — S83.272A COMPLEX TEAR OF LATERAL MENISCUS OF LEFT KNEE AS CURRENT INJURY, INITIAL ENCOUNTER: Primary | ICD-10-CM

## 2024-08-26 LAB
GLUCOSE BLD MANUAL STRIP-MCNC: 107 MG/DL (ref 74–99)
GLUCOSE BLD MANUAL STRIP-MCNC: 122 MG/DL (ref 74–99)

## 2024-08-26 PROCEDURE — 2500000005 HC RX 250 GENERAL PHARMACY W/O HCPCS: Performed by: ORTHOPAEDIC SURGERY

## 2024-08-26 PROCEDURE — 2500000001 HC RX 250 WO HCPCS SELF ADMINISTERED DRUGS (ALT 637 FOR MEDICARE OP): Performed by: STUDENT IN AN ORGANIZED HEALTH CARE EDUCATION/TRAINING PROGRAM

## 2024-08-26 PROCEDURE — 2500000004 HC RX 250 GENERAL PHARMACY W/ HCPCS (ALT 636 FOR OP/ED): Performed by: ORTHOPAEDIC SURGERY

## 2024-08-26 PROCEDURE — 3700000001 HC GENERAL ANESTHESIA TIME - INITIAL BASE CHARGE: Performed by: ORTHOPAEDIC SURGERY

## 2024-08-26 PROCEDURE — 7100000009 HC PHASE TWO TIME - INITIAL BASE CHARGE: Performed by: ORTHOPAEDIC SURGERY

## 2024-08-26 PROCEDURE — 7100000002 HC RECOVERY ROOM TIME - EACH INCREMENTAL 1 MINUTE: Performed by: ORTHOPAEDIC SURGERY

## 2024-08-26 PROCEDURE — 2720000007 HC OR 272 NO HCPCS: Performed by: ORTHOPAEDIC SURGERY

## 2024-08-26 PROCEDURE — 2500000004 HC RX 250 GENERAL PHARMACY W/ HCPCS (ALT 636 FOR OP/ED): Performed by: STUDENT IN AN ORGANIZED HEALTH CARE EDUCATION/TRAINING PROGRAM

## 2024-08-26 PROCEDURE — 7100000010 HC PHASE TWO TIME - EACH INCREMENTAL 1 MINUTE: Performed by: ORTHOPAEDIC SURGERY

## 2024-08-26 PROCEDURE — 3600000009 HC OR TIME - EACH INCREMENTAL 1 MINUTE - PROCEDURE LEVEL FOUR: Performed by: ORTHOPAEDIC SURGERY

## 2024-08-26 PROCEDURE — 3700000002 HC GENERAL ANESTHESIA TIME - EACH INCREMENTAL 1 MINUTE: Performed by: ORTHOPAEDIC SURGERY

## 2024-08-26 PROCEDURE — 3600000004 HC OR TIME - INITIAL BASE CHARGE - PROCEDURE LEVEL FOUR: Performed by: ORTHOPAEDIC SURGERY

## 2024-08-26 PROCEDURE — 2500000004 HC RX 250 GENERAL PHARMACY W/ HCPCS (ALT 636 FOR OP/ED): Performed by: ANESTHESIOLOGIST ASSISTANT

## 2024-08-26 PROCEDURE — 82947 ASSAY GLUCOSE BLOOD QUANT: CPT

## 2024-08-26 PROCEDURE — A29881 PR KNEE SCOPE,MED/LAT MENISECTOMY: Performed by: ANESTHESIOLOGIST ASSISTANT

## 2024-08-26 PROCEDURE — RXMED WILLOW AMBULATORY MEDICATION CHARGE

## 2024-08-26 PROCEDURE — 2500000005 HC RX 250 GENERAL PHARMACY W/O HCPCS: Performed by: ANESTHESIOLOGIST ASSISTANT

## 2024-08-26 PROCEDURE — 29881 ARTHRS KNE SRG MNISECTMY M/L: CPT | Performed by: ORTHOPAEDIC SURGERY

## 2024-08-26 PROCEDURE — 7100000001 HC RECOVERY ROOM TIME - INITIAL BASE CHARGE: Performed by: ORTHOPAEDIC SURGERY

## 2024-08-26 PROCEDURE — A29881 PR KNEE SCOPE,MED/LAT MENISECTOMY: Performed by: STUDENT IN AN ORGANIZED HEALTH CARE EDUCATION/TRAINING PROGRAM

## 2024-08-26 RX ORDER — FENTANYL CITRATE 50 UG/ML
INJECTION, SOLUTION INTRAMUSCULAR; INTRAVENOUS AS NEEDED
Status: DISCONTINUED | OUTPATIENT
Start: 2024-08-26 | End: 2024-08-26

## 2024-08-26 RX ORDER — ONDANSETRON HYDROCHLORIDE 2 MG/ML
4 INJECTION, SOLUTION INTRAVENOUS ONCE AS NEEDED
Status: COMPLETED | OUTPATIENT
Start: 2024-08-26 | End: 2024-08-26

## 2024-08-26 RX ORDER — OXYCODONE HYDROCHLORIDE 5 MG/1
5 TABLET ORAL EVERY 4 HOURS PRN
Qty: 21 TABLET | Refills: 0 | Status: SHIPPED | OUTPATIENT
Start: 2024-08-26

## 2024-08-26 RX ORDER — LIDOCAINE HYDROCHLORIDE 20 MG/ML
INJECTION, SOLUTION INFILTRATION; PERINEURAL AS NEEDED
Status: DISCONTINUED | OUTPATIENT
Start: 2024-08-26 | End: 2024-08-26

## 2024-08-26 RX ORDER — DIPHENHYDRAMINE HYDROCHLORIDE 50 MG/ML
12.5 INJECTION INTRAMUSCULAR; INTRAVENOUS ONCE AS NEEDED
Status: DISCONTINUED | OUTPATIENT
Start: 2024-08-26 | End: 2024-08-26 | Stop reason: HOSPADM

## 2024-08-26 RX ORDER — LABETALOL HYDROCHLORIDE 5 MG/ML
5 INJECTION, SOLUTION INTRAVENOUS ONCE AS NEEDED
Status: DISCONTINUED | OUTPATIENT
Start: 2024-08-26 | End: 2024-08-26 | Stop reason: HOSPADM

## 2024-08-26 RX ORDER — MIDAZOLAM HYDROCHLORIDE 1 MG/ML
INJECTION INTRAMUSCULAR; INTRAVENOUS AS NEEDED
Status: DISCONTINUED | OUTPATIENT
Start: 2024-08-26 | End: 2024-08-26

## 2024-08-26 RX ORDER — ACETAMINOPHEN 325 MG/1
650 TABLET ORAL EVERY 6 HOURS PRN
Qty: 120 TABLET | Refills: 0 | Status: SHIPPED | OUTPATIENT
Start: 2024-08-26 | End: 2024-09-10

## 2024-08-26 RX ORDER — OXYCODONE HYDROCHLORIDE 5 MG/1
5 TABLET ORAL EVERY 4 HOURS PRN
Status: DISCONTINUED | OUTPATIENT
Start: 2024-08-26 | End: 2024-08-26 | Stop reason: HOSPADM

## 2024-08-26 RX ORDER — ONDANSETRON HYDROCHLORIDE 2 MG/ML
INJECTION, SOLUTION INTRAVENOUS AS NEEDED
Status: DISCONTINUED | OUTPATIENT
Start: 2024-08-26 | End: 2024-08-26

## 2024-08-26 RX ORDER — CEFAZOLIN 1 G/1
INJECTION, POWDER, FOR SOLUTION INTRAVENOUS AS NEEDED
Status: DISCONTINUED | OUTPATIENT
Start: 2024-08-26 | End: 2024-08-26

## 2024-08-26 RX ORDER — LIDOCAINE HYDROCHLORIDE 10 MG/ML
0.1 INJECTION, SOLUTION EPIDURAL; INFILTRATION; INTRACAUDAL; PERINEURAL ONCE
Status: DISCONTINUED | OUTPATIENT
Start: 2024-08-26 | End: 2024-08-26 | Stop reason: HOSPADM

## 2024-08-26 RX ORDER — PROPOFOL 10 MG/ML
INJECTION, EMULSION INTRAVENOUS AS NEEDED
Status: DISCONTINUED | OUTPATIENT
Start: 2024-08-26 | End: 2024-08-26

## 2024-08-26 RX ORDER — ASPIRIN 81 MG/1
81 TABLET ORAL 2 TIMES DAILY
Qty: 60 TABLET | Refills: 0 | Status: SHIPPED | OUTPATIENT
Start: 2024-08-26 | End: 2024-09-25

## 2024-08-26 RX ORDER — DOCUSATE SODIUM 100 MG/1
100 CAPSULE, LIQUID FILLED ORAL 2 TIMES DAILY PRN
Qty: 30 CAPSULE | Refills: 0 | Status: SHIPPED | OUTPATIENT
Start: 2024-08-26 | End: 2024-09-10

## 2024-08-26 RX ORDER — SODIUM CHLORIDE, SODIUM LACTATE, POTASSIUM CHLORIDE, CALCIUM CHLORIDE 600; 310; 30; 20 MG/100ML; MG/100ML; MG/100ML; MG/100ML
100 INJECTION, SOLUTION INTRAVENOUS CONTINUOUS
Status: DISCONTINUED | OUTPATIENT
Start: 2024-08-26 | End: 2024-08-26 | Stop reason: HOSPADM

## 2024-08-26 RX ORDER — SODIUM CHLORIDE, SODIUM LACTATE, POTASSIUM CHLORIDE, AND CALCIUM CHLORIDE .6; .31; .03; .02 G/100ML; G/100ML; G/100ML; G/100ML
IRRIGANT IRRIGATION AS NEEDED
Status: DISCONTINUED | OUTPATIENT
Start: 2024-08-26 | End: 2024-08-26 | Stop reason: HOSPADM

## 2024-08-26 ASSESSMENT — PAIN SCALES - GENERAL
PAINLEVEL_OUTOF10: 5 - MODERATE PAIN
PAINLEVEL_OUTOF10: 3
PAINLEVEL_OUTOF10: 3
PAINLEVEL_OUTOF10: 5 - MODERATE PAIN
PAINLEVEL_OUTOF10: 5 - MODERATE PAIN
PAINLEVEL_OUTOF10: 3
PAINLEVEL_OUTOF10: 7
PAINLEVEL_OUTOF10: 3
PAINLEVEL_OUTOF10: 5 - MODERATE PAIN
PAINLEVEL_OUTOF10: 5 - MODERATE PAIN
PAINLEVEL_OUTOF10: 7
PAINLEVEL_OUTOF10: 7
PAINLEVEL_OUTOF10: 5 - MODERATE PAIN
PAINLEVEL_OUTOF10: 8

## 2024-08-26 ASSESSMENT — PAIN DESCRIPTION - ORIENTATION
ORIENTATION: LEFT
ORIENTATION: LEFT

## 2024-08-26 ASSESSMENT — PAIN DESCRIPTION - LOCATION
LOCATION: KNEE
LOCATION: KNEE

## 2024-08-26 ASSESSMENT — COLUMBIA-SUICIDE SEVERITY RATING SCALE - C-SSRS
1. IN THE PAST MONTH, HAVE YOU WISHED YOU WERE DEAD OR WISHED YOU COULD GO TO SLEEP AND NOT WAKE UP?: NO
6. HAVE YOU EVER DONE ANYTHING, STARTED TO DO ANYTHING, OR PREPARED TO DO ANYTHING TO END YOUR LIFE?: NO
2. HAVE YOU ACTUALLY HAD ANY THOUGHTS OF KILLING YOURSELF?: NO

## 2024-08-26 NOTE — DISCHARGE INSTRUCTIONS
KNEE ARTHROSCOPY  Before surgery:  If you are on anticoagulants or aspirin therapy, please contact your primary care physician.  These medications will need to be stopped prior to surgery.  You will need to use crutches or a walker for a day or two after surgery. If you would like preoperative instruction in crutch-walking, an appointment with physical therapy can be arranged.    Day of surgery:  The hospital will contact you regarding your arrival time.  Wear loose fitting clothes. Bring crutches/walker if you have them, otherwise the hospital will provide.  You will need a ride home    After surgery:  You may bear weight as tolerated, unless you are told otherwise. If your surgery involves repairing the meniscus or drilling the bone, you will be limited from bearing weight for 6 weeks or so.  Ice your knee generously.  You may remove your bandage in 2 days. Simply place bandaids over the incisions, and loosely wrap your knee with an ace bandage.  You may shower on postoperative day 2, just don't soak your knee.  Pump your foot up and down frequently. Get your knee moving. This will help prevent stiffness as well as prevent blood clots.  Adults are advised to take a baby strength aspirin twice a day( if able), also to help prevent blood clots.  Light exercise, such as riding an exercise bike with low resistance is recommended beginning on post operative day 3.  Make a follow-up appointment one week after surgery.  Bring your operative pictures to review. Depending on your circumstance, physical therapy may be ordered.      IF YOU HAVE ANY QUESTIONS OR CONCERNS  CALL  [phone]         Prior to injection verified patient identity using patient's name and date of birth.    Screening Questionnaire for Pediatric Immunization     Is the child sick today?   No    Does the child have allergies to medications, food a vaccine component, or latex?   No    Has the child had a serious reaction to a vaccine in the past?   No    Has the child had a health problem with lung, heart, kidney or metabolic disease (e.g., diabetes), asthma, or a blood disorder?  Is he/she on long-term aspirin therapy?   No    If the child to be vaccinated is 2 through 4 years of age, has a healthcare provider told you that the child had wheezing or asthma in the  past 12 months?   No   If your child is a baby, have you ever been told he or she has had intussusception ?   No    Has the child, sibling or parent had a seizure, has the child had brain or other nervous system problems?   No    Does the child have cancer, leukemia, AIDS, or any immune system          problem?   No    In the past 3 months, has the child taken medications that affect the immune system such as prednisone, other steroids, or anticancer drugs; drugs for the treatment of rheumatoid arthritis, Crohn s disease, or psoriasis; or had radiation treatments?   No   In the past year, has the child received a transfusion of blood or blood products, or been given immune (gamma) globulin or an antiviral drug?   No    Is the child/teen pregnant or is there a chance that she could become         pregnant during the next month?   No    Has the child received any vaccinations in the past 4 weeks?   No      Immunization questionnaire answers were all negative.        [place] eligibility self-screening form given to patient.    Per orders of Dr. Salvador, injection of TDAP, HEP A, MENACTRA & HPV given by Zaina Luna CMA. Patient instructed to remain in clinic for 15 minutes afterwards, and to report any adverse reaction to me immediately.    Screening performed by Zaina Luna  CMA on 4/23/2018 at 11:11 AM.

## 2024-08-26 NOTE — OP NOTE
Left Knee Arthroscopy; Lateral Meniscectomy (L) Operative Note     Date: 2024  OR Location: [facility] OR    Name: Dot Sevilla, : 1960, Age: 63 y.o., MRN: [id number], Sex: female    Diagnosis  Pre-op Diagnosis      * Complex tear of lateral meniscus of left knee as current injury, initial encounter [S83.272A] Post-op Diagnosis     * Complex tear of lateral meniscus of left knee as current injury, initial encounter [S83.272A]   arthritis     Procedures  Left Knee Arthroscopy; Lateral Meniscectomy  94056 - RI ARTHRS KNE SURG W/MENISCECTOMY MED/LAT W/SHVG    16570 chondroplasty lateral femoralcondyle  Surgeons      * Jules Cotter - Primary    Resident/Fellow/Other Assistant:  Surgeons and Role:     * Hayley Gonzalez DO - Resident - Assisting    Procedure Summary  Anesthesia: General  ASA: II  Anesthesia Staff: Anesthesiologist: James Barlow MD  C-AA: SHIVANI Toledo  Estimated Blood Loss: 0mL  Intra-op Medications:   Administrations occurring from 0830 to 1000 on 24:   Medication Name Total Dose   lactated Ringer's irrigation solution 6,000 mL   lidocaine-epinephrine (Xylocaine W/EPI) 1 %-1:100,000 20 mL, morphine PF (Duramorph) 5 mg syringe 30 mL   HYDROmorphone (Dilaudid) injection 0.5 mg 0.5 mg              Anesthesia Record               Intraprocedure I/O Totals          Intake    LR bolus 300.00 mL    Total Intake 300 mL       Output    Est. Blood Loss 2 mL    Total Output 2 mL       Net    Net Volume 298 mL          Specimen: No specimens collected     Staff:   Circulator: Delia Rodriguez Person: Ericka         Drains and/or Catheters: * None in log *    Tourniquet Times:     Total Tourniquet Time Documented:  Thigh (Left) - 15 minutes  Total: Thigh (Left) - 15 minutes      Implants:     Findings: djd    Indications: Dot Sevilla is an 63 y.o. female who is having surgery for Complex tear of lateral meniscus of left knee as current injury, initial encounter [S83.272A].     The  patient was seen in the preoperative area. The risks, benefits, complications, treatment options, non-operative alternatives, expected recovery and outcomes were discussed with the patient. The possibilities of reaction to medication, pulmonary aspiration, injury to surrounding structures, bleeding, recurrent infection, the need for additional procedures, failure to diagnose a condition, and creating a complication requiring transfusion or operation were discussed with the patient. The patient concurred with the proposed plan, giving informed consent.  The site of surgery was properly noted/marked if necessary per policy. The patient has been actively warmed in preoperative area. Preoperative antibiotics have been ordered and given within 1 hours of incision. Venous thrombosis prophylaxis have been ordered including unilateral sequential compression device    Procedure Details: Preoperative huddle is performed with patient identification procedure and site verification.  Patient given prophylactic antibiotics.  The left leg was then sterilely prepped and draped in usual fashion.  A thigh tourniquet was inflated no leg hinojosa was utilized.  Standard medial portal was established the lateral joint was entered inspection lateral joint revealed tearing of the lateral meniscus extending from the body to the anterior horn the popliteal hiatus was not violated the posterior and was was demonstrated a small parrot-beak tear of the far posterior horn the parrot-beak tear was excised as well as the torn segment of the body lateral meniscus extending to the anterior horn.  There was an area of full-thickness chondral flap in the lateral femoral condyle which was debrided using a high-speed shaver/chondroplasty performed inspection of the medial joint revealed intact ACL there are minor degenerative changes of the articular surfaces no loose chondral fragments no exposed bone.  There are partial-thickness changes the  patellofemoral area as well.  Arthroscopic equipment was then removed.  The portals were closed using interrupted nonverbal sutures and the knee was look injected with combination of lidocaine and morphine.  Sterile dressing applied.  Complications:  None; patient tolerated the procedure well.    Disposition: PACU - hemodynamically stable.  Condition: stable         Additional Details:     Attending Attestation: I was present for the entire procedure.    Jules Cotter  Phone Number: [phone]

## 2024-08-26 NOTE — NURSING NOTE
1100 Assuming care of pt at this time. Bedside report received from outgoing RN.   1115 dilaudid 0.5mg given, pt repositioned   1123 oxy 5mg given with goldfish crackers   1130 no change in assessment, meds to beds called   1145  Pt meets discharge criteria from phase 1 at this time

## 2024-08-26 NOTE — BRIEF OP NOTE
Date: 2024  OR Location: [facility] OR    Name: Dot Sevilla, : 1960, Age: 63 y.o., MRN: [id number], Sex: female    Diagnosis  Pre-op Diagnosis      * Complex tear of lateral meniscus of left knee as current injury, initial encounter [S83.661A] Post-op Diagnosis     * Complex tear of lateral meniscus of left knee as current injury, initial encounter [S83.232A]     Procedures  Left Knee Arthroscopy; Lateral Meniscectomy  60678 - DE ARTHRS KNE SURG W/MENISCECTOMY MED/LAT W/SHVG      Surgeons      * Jules Cotter - Primary    Resident/Fellow/Other Assistant:  Surgeons and Role:     * Hayley Gonzalez DO - Resident - Assisting  Armando Strickland MD- Resident- Assisting    Procedure Summary  Anesthesia: Anesthesia type not filed in the log.  ASA: II  Anesthesia Staff: Anesthesiologist: James Barlow MD  C-AA: SHIVANI Toledo  Estimated Blood Loss: 2mL  Intra-op Medications:   Administrations occurring from 0830 to 1000 on 24:   Medication Name Total Dose   lactated Ringer's irrigation solution 6,000 mL   lidocaine-epinephrine (Xylocaine W/EPI) 1 %-1:100,000 20 mL, morphine PF (Duramorph) 5 mg syringe 30 mL              Anesthesia Record               Intraprocedure I/O Totals          Intake    LR bolus 300.00 mL    Total Intake 300 mL       Output    Est. Blood Loss 2 mL    Total Output 2 mL       Net    Net Volume 298 mL          Specimen: No specimens collected     Staff:   Circulator: Delia  Scrub Person: Ericka          Findings: complex degenerative tear of lateral meniscus, large kissing lesion of lateral compartment    Complications:  None; patient tolerated the procedure well.     Disposition: PACU - hemodynamically stable.  Condition: stable  Specimens Collected: No specimens collected    Hayley Gonzalez DO     Attending Attestation: I was present and scrubbed for the entire procedure.    Jules Cotter  Phone Number: [phone]

## 2024-08-26 NOTE — ANESTHESIA PREPROCEDURE EVALUATION
Patient: Dot Sevilla    Procedure Information       Date/Time: 08/26/24 0830    Procedure: Left Knee Arthroscopy; Lateral Meniscectomy (Left: Knee)    Location: [facility] OR 17 / [facility] OR    Surgeons: Jules Cotter MD          CONCLUSIONS:   1. Left ventricular systolic function is normal with a 60-65% estimated ejection fraction.   2. Spectral Doppler shows an impaired relaxation pattern of left ventricular diastolic filling.    History Comments   Asthma (HHS-HCC)    Type 2 diabetes mellitus (Multi) 4/23/24 A1C 8.1%   Complex tear of lateral meniscus of left knee as current injury, initial encounter Plan: Left Knee Arthroscopy; Lateral Meniscectomy 8/26/24   Cardiology follow-up encounter Marshall Hoang,    Hypertension    Depression with anxiety    Diabetic neuropathy (Multi)    GERD (gastroesophageal reflux disease)    Glaucoma suspect, both eyes    Gout    Insomnia    Obesity      Relevant Problems   Cardiac   (+) Atypical chest pain   (+) Benign essential hypertension      Pulmonary   (+) Asthma (HHS-HCC)   (+) Dyspnea on exertion      Neuro   (+) Depression with anxiety   (+) Radiculopathy of leg      GI   (+) GERD (gastroesophageal reflux disease)      /Renal   (+) UTI (urinary tract infection)      Endocrine   (+) Diabetes mellitus type 2 without retinopathy (Multi)   (+) Diabetic neuropathy (Multi)   (+) Dyslipidemia associated with type 2 diabetes mellitus (Multi)      Hematology   (+) Microcytic anemia      Musculoskeletal   (+) Primary localized osteoarthrosis of right lower leg      ID   (+) Bacterial vaginosis   (+) UTI (urinary tract infection)   (+) Yeast infection       Clinical information reviewed:                   NPO Detail:  No data recorded     Physical Exam    Airway  Mallampati: II  TM distance: >3 FB  Neck ROM: full     Cardiovascular   Rhythm: regular  Rate: normal     Dental   (+) lower dentures, upper dentures     Pulmonary   Breath sounds clear to auscultation      Abdominal            Anesthesia Plan    History of general anesthesia?: yes  History of complications of general anesthesia?: no    ASA 2     general     intravenous induction   Anesthetic plan and risks discussed with patient.    Plan discussed with CRNA.

## 2024-08-26 NOTE — ANESTHESIA PROCEDURE NOTES
Airway  Date/Time: 8/26/2024 8:58 AM  Urgency: elective    Airway not difficult    Staffing  Performed: SOPHIE   Authorized by: James Barlow MD    Performed by: SHIVANI Toledo  Patient location during procedure: OR    Indications and Patient Condition  Indications for airway management: anesthesia  Spontaneous Ventilation: absent  Sedation level: deep  Preoxygenated: yes  Patient position: sniffing  MILS not maintained throughout  Mask difficulty assessment: 1 - vent by mask  Planned trial extubation    Final Airway Details  Final airway type: supraglottic airway      Successful airway: Supraglottic airway: IGel.  Size 4     Number of attempts at approach: 1    Additional Comments  Uneventful intubation preformed by MSA1

## 2024-08-26 NOTE — NURSING NOTE
1146 Discharge instructions provided to pt and family. Educated on medications, effects of anesthesia, and homecoming care. Pt and family verbalizing understanding of all instructions provided at this time. All questions and concerns answered. Pt given contact information for provider.     1200 Pt assisted with dressing with help of family. IV removed dressing applied. Awaiting meds to beds pharmacist   1205 pharmacist at bedside, pt placed for transport    1221 Pt assisted to main lobby with spouse via wc by transport. Dc in stable condition to home. All belongings taken with pt.

## 2024-09-03 ENCOUNTER — APPOINTMENT (OUTPATIENT)
Dept: ORTHOPEDIC SURGERY | Facility: CLINIC | Age: 64
End: 2024-09-03
Payer: COMMERCIAL

## 2024-09-06 ENCOUNTER — APPOINTMENT (OUTPATIENT)
Dept: ORTHOPEDIC SURGERY | Facility: CLINIC | Age: 64
End: 2024-09-06
Payer: COMMERCIAL

## 2024-09-06 VITALS — HEIGHT: 65 IN | BODY MASS INDEX: 38.65 KG/M2 | WEIGHT: 232 LBS

## 2024-09-06 DIAGNOSIS — S83.282S TEAR OF LATERAL MENISCUS OF LEFT KNEE, CURRENT, UNSPECIFIED TEAR TYPE, SEQUELA: ICD-10-CM

## 2024-09-06 PROCEDURE — 3044F HG A1C LEVEL LT 7.0%: CPT | Performed by: ORTHOPAEDIC SURGERY

## 2024-09-06 PROCEDURE — 4010F ACE/ARB THERAPY RXD/TAKEN: CPT | Performed by: ORTHOPAEDIC SURGERY

## 2024-09-06 PROCEDURE — 1125F AMNT PAIN NOTED PAIN PRSNT: CPT | Performed by: ORTHOPAEDIC SURGERY

## 2024-09-06 PROCEDURE — 99024 POSTOP FOLLOW-UP VISIT: CPT | Performed by: ORTHOPAEDIC SURGERY

## 2024-09-06 PROCEDURE — 1036F TOBACCO NON-USER: CPT | Performed by: ORTHOPAEDIC SURGERY

## 2024-09-06 PROCEDURE — 1159F MED LIST DOCD IN RCRD: CPT | Performed by: ORTHOPAEDIC SURGERY

## 2024-09-06 PROCEDURE — 3048F LDL-C <100 MG/DL: CPT | Performed by: ORTHOPAEDIC SURGERY

## 2024-09-06 PROCEDURE — 3008F BODY MASS INDEX DOCD: CPT | Performed by: ORTHOPAEDIC SURGERY

## 2024-09-06 ASSESSMENT — PAIN DESCRIPTION - DESCRIPTORS: DESCRIPTORS: THROBBING

## 2024-09-06 ASSESSMENT — PAIN - FUNCTIONAL ASSESSMENT: PAIN_FUNCTIONAL_ASSESSMENT: 0-10

## 2024-09-06 ASSESSMENT — PAIN SCALES - GENERAL: PAINLEVEL_OUTOF10: 8

## 2024-09-06 NOTE — PROGRESS NOTES
Postop lateral meniscectomy right knee generally feels well incisions look fine sutures removed today small effusion no calf tenderness assessment status post lateral meniscectomy reviewed operative findings expectations physical therapy referral follow-up 3 to 4 weeks as needed

## 2024-09-16 ENCOUNTER — APPOINTMENT (OUTPATIENT)
Dept: PRIMARY CARE | Facility: CLINIC | Age: 64
End: 2024-09-16
Payer: COMMERCIAL

## 2024-09-24 ENCOUNTER — CLINICAL SUPPORT (OUTPATIENT)
Dept: NUTRITION | Facility: HOSPITAL | Age: 64
End: 2024-09-24
Payer: COMMERCIAL

## 2024-09-24 NOTE — PROGRESS NOTES
[employer]  Diet Recommendation 1: Diabetes  Diet Recommendation 2: Heart Healthy  Diet Recommendation 3: Healthy Eating  Food Intolerance Avoidance: NKFA  Household Size: 4 Members (Max/[place])  Interventions: Referral Number: 3rd 6 Mo Referral 1.5 yrs (Referrals may not be consecutive)  Interventions: Visit Number: 5 of 6 Visits - Max 6 Visits/Referral Each 6 Mo Period  Education Today: [employer] Meals  Grains: 50-75% Whole  Fruit: Fresh - 100%  Vegetables: 0-25% Fresh  Proteins: 4 or more Plant-based Items  Dairy: 0-25% Lowfat  Originating Site of Referral Order: [facility] [place] [facility]  Initials of RD Assisting Today: YENI

## 2024-10-02 ENCOUNTER — EVALUATION (OUTPATIENT)
Dept: PHYSICAL THERAPY | Facility: CLINIC | Age: 64
End: 2024-10-02
Payer: COMMERCIAL

## 2024-10-02 DIAGNOSIS — S83.282S TEAR OF LATERAL MENISCUS OF LEFT KNEE, CURRENT, UNSPECIFIED TEAR TYPE, SEQUELA: ICD-10-CM

## 2024-10-02 DIAGNOSIS — M25.562 LEFT KNEE PAIN: Primary | ICD-10-CM

## 2024-10-02 PROCEDURE — 97016 VASOPNEUMATIC DEVICE THERAPY: CPT | Mod: GP | Performed by: PHYSICAL THERAPIST

## 2024-10-02 PROCEDURE — 97110 THERAPEUTIC EXERCISES: CPT | Mod: GP | Performed by: PHYSICAL THERAPIST

## 2024-10-02 PROCEDURE — 97161 PT EVAL LOW COMPLEX 20 MIN: CPT | Mod: GP | Performed by: PHYSICAL THERAPIST

## 2024-10-02 ASSESSMENT — ENCOUNTER SYMPTOMS
DEPRESSION: 1
OCCASIONAL FEELINGS OF UNSTEADINESS: 1
LOSS OF SENSATION IN FEET: 0

## 2024-10-02 ASSESSMENT — PATIENT HEALTH QUESTIONNAIRE - PHQ9
9. THOUGHTS THAT YOU WOULD BE BETTER OFF DEAD, OR OF HURTING YOURSELF: NOT AT ALL
2. FEELING DOWN, DEPRESSED OR HOPELESS: NEARLY EVERY DAY
SUM OF ALL RESPONSES TO PHQ9 QUESTIONS 1 AND 2: 4
1. LITTLE INTEREST OR PLEASURE IN DOING THINGS: SEVERAL DAYS
4. FEELING TIRED OR HAVING LITTLE ENERGY: NEARLY EVERY DAY
3. TROUBLE FALLING OR STAYING ASLEEP OR SLEEPING TOO MUCH: SEVERAL DAYS

## 2024-10-02 NOTE — PROGRESS NOTES
PHYSICAL THERAPY   EVALUATION & TREATMENT NOTE    Patient Name:  Dot Sevilla   Patient MRN: [id number]  Date: 10/2/2024    Time Calculation  Start Time: 1145  Stop Time: 1229  Time Calculation (min): 44 min    Insurance:  Insurance Type: [employer] Dual Complete  Visit number: 1  Approved # of visits [place]  Authorization Needed: No    General   Reason for visit: L Lateral meniscectomy   DOS 8/26/24  Referred by: Jules Cotter    Therapy diagnoses:   Problem List Items Addressed This Visit             ICD-10-CM    Left knee pain - Primary M25.562     Other Visit Diagnoses         Codes    Tear of lateral meniscus of left knee, current, unspecified tear type, sequela     S83.282S            ASSESSMENT   64 y/o female c/o L knee pain s/p lateral meniscectomy 8/26/24 presenting to PT today with post op changes including decreased L knee ROM, TTP at lateral joint line, min-mod edema around knee, decreased L LE strength, decreased SL stability affecting her ability to perform her usual ADLS including housework, bathing normally, walking community distances, and navigating stairs into/out of her home. Patient will benefit from skilled therapy to address these impairments and return to prior level of functioning.     PLAN   Goals  1. Pt will be independent in HEP in 6-8 weeks  2. Pt will report 0-2/10 L knee pain at rest and with activity in 6-8 weeks.  3. Pt will demonstrate 5/5 B LE strength to improve stair navigation in 6-8 weeks  4. Pt will demonstrate full L knee ROM to improve getting in and out of the tub in 6-8 weeks.  5. Pt will report LEFS score >50/80 in 6-8 weeks.    Plan of care to include: therapeutic exercise, therapeutic activity, soft tissue mobilization, joint mobilizations, neuromuscular re-education, pt education, self care activities, home program, vaso/cryotherapy, gait training, e-stim    1-2x/week for 6-8 weeks.    Patient agrees to plan of care.    SUBJECTIVE   Reviewed medical history form with  "patient and medical screening assessed     Has had L knee pain since March, just woke up one day with pain and thought it was arthritis- did some home PT and finally got an MRI and then lateral meniscectomy 8/26/24. Has a walker and crutches and a cane but she only uses these around the house. Taking ibuprofen and Tylenol arthritis.  Fell 3 months ago when her knee popped. Before that she fell 3x last year always because her leg is giving out on her.    Pain:  L anterior and lateral knee, refers up and down the knee.  At worst, pain is 10/10  Exacerbating factors include nighttime, stairs  At best, pain is 0/10  Relieving factors include resting, propping her leg up, taking ibuprofen    Function:  Has to take breaks to do her usual household chores. Has a hard time getting in and out of the tub.  Sleep - has a hard time sleeping because of her depression  Lives in home with 5 stairs to get in door, and 17 stairs to get into home with rails on both sides. Lives with her son who helps her out.   Baseline function: prior to March was fully IND without knee pain    Work: disability for other issues    Social: goes to [employer] and talks to her  to help with her depression  Exercise - before her knee started hurting she would go to [employer] - weight machines 3x/week with treadmill walking    Pt goals: walk normally, pain goes away    Language: English  Potential barriers to treatment: continue to assess    Precautions:    PMH: DM II, HTN, asthma, obesity, gout  Fall risk -  low      OBJECTIVE *=painful  Gait Ambulates IND, decreased L knee flexion, decreased L heel strike    Observation/Posture  Sit to stand - IND but very difficult and L valgus collapse    Balance  SLS R 3\", L 5\"    Palpation  (+) TTP L lateral knee joint line    Range of Motion (R, L in degrees)  Knee Flexion  136, 107*  Knee Extension 0, lack 12*    Flexibility (R, L)  Hamstrings WNL, WNL    Strength (R, L MMT out of 5)  Hip Flexion 4+, " 3+*  Knee Flexion 5, 5  Knee Extension 5, 3+*    Outcome Measures  LEFS = 23/80    Today's treatment and initial evaluation included:  - Patient education regarding diagnosis, prognosis, contributing factors, comorbidities, activity modification, symptom monitoring, importance of HEP, role of PT, postural re-education, weight loss, appropriate shoe wear, body mechanics, cane use in gait, reviewed office cancel/no show policy.  - Review of POC   - Therapeutic Exercise & given HEP handout:  Access Code: [email or web address]  - Supine Quadricep Sets  - 2 x daily - 10 reps - 5 sec hold  - Supine Active Straight Leg Raise  - 2 x daily - 2 sets - 10 reps  - Supine Heel Slide with Strap  - 2 x daily - 10 reps - 10 sec hold  - Supine Bridge  - 2 x daily - 2 sets - 10 reps  - Single Leg Stance with Support  - 2 x daily - 3 reps - 30 sec hold  PT Evaluation Time Entry  PT Evaluation (Low) Time Entry: 24  PT Therapeutic Procedures Time Entry  Therapeutic Exercise Time Entry: 10  PT Modalities Time Entry  Vasopneumatic Devices Time Entry: 10

## 2024-10-04 ENCOUNTER — APPOINTMENT (OUTPATIENT)
Dept: PHYSICAL THERAPY | Facility: CLINIC | Age: 64
End: 2024-10-04
Payer: COMMERCIAL

## 2024-10-14 DIAGNOSIS — M10.9 GOUT, UNSPECIFIED CAUSE, UNSPECIFIED CHRONICITY, UNSPECIFIED SITE: ICD-10-CM

## 2024-10-16 ENCOUNTER — TREATMENT (OUTPATIENT)
Dept: PHYSICAL THERAPY | Facility: CLINIC | Age: 64
End: 2024-10-16
Payer: COMMERCIAL

## 2024-10-16 DIAGNOSIS — M25.562 LEFT KNEE PAIN: ICD-10-CM

## 2024-10-16 PROCEDURE — 97016 VASOPNEUMATIC DEVICE THERAPY: CPT | Mod: GP | Performed by: PHYSICAL THERAPIST

## 2024-10-16 PROCEDURE — 97110 THERAPEUTIC EXERCISES: CPT | Mod: GP | Performed by: PHYSICAL THERAPIST

## 2024-10-16 NOTE — PROGRESS NOTES
"PHYSICAL THERAPY   TREATMENT NOTE    Patient Name:  Dot Sevilla   Patient MRN: [id number]  Date: 10/16/2024    Time Calculation  Start Time: 1000  Stop Time: 1044  Time Calculation (min): 44 min    Insurance:  Insurance Type: [employer] Dual Complete  Visit number: 2    Approved # of visits [place]  Authorization Needed: no      General   Reason for visit: L Lateral meniscectomy   DOS 8/26/24  Referred by: Jules Cotter    Therapy diagnoses:   Problem List Items Addressed This Visit             ICD-10-CM    Left knee pain M25.562       Assessment:    Pt does well with exercises and reports decreased pain following exercise and modalities today.  Quad lag with supine SLR but good technique with side SLR.     Plan:  Continue to work on LE stabilization, knee ROM, and overall strength.     Subjective  Pt states her knee is really hurting today and thinks it is related to the rain.  Has been working on the exercises from last visit.    - Pain (0-10): 10/10     Precautions:    PMH: DM II, HTN, asthma, obesity, gout  Fall risk -  low    Objective    Treatment Performed:   Therapeutic Exercise:    - Nustep L3 8 minutes   - standing B HR 10x 2   - standing marching 10x R/L x 2  - HC stretch slant board 30\" x 2  - supine QS left 5\" x 10  - supine SLR left 10x  - SB supine knee flexion 20x  - bridge 10x  - heel slides left 10x  - side SLR 10x left  - sitting hamstring stretch 20\" x 2 R/L       Manual Therapy:     Neuromuscular Re-education:     Gait Training:      Modalities: minutes  - Game ready 34 degrees mod compression - 10 minutes      PT Therapeutic Procedures Time Entry  Therapeutic Exercise Time Entry: 34  PT Modalities Time Entry  Vasopneumatic Devices Time Entry: 10                "

## 2024-10-17 RX ORDER — COLCHICINE 0.6 MG/1
TABLET ORAL
Qty: 90 TABLET | Refills: 3 | Status: SHIPPED | OUTPATIENT
Start: 2024-10-17

## 2024-10-18 ENCOUNTER — CLINICAL SUPPORT (OUTPATIENT)
Dept: PHYSICAL THERAPY | Facility: CLINIC | Age: 64
End: 2024-10-18
Payer: COMMERCIAL

## 2024-10-18 DIAGNOSIS — G89.29 CHRONIC PAIN OF LEFT KNEE: Primary | ICD-10-CM

## 2024-10-18 DIAGNOSIS — M25.562 CHRONIC PAIN OF LEFT KNEE: Primary | ICD-10-CM

## 2024-10-18 DIAGNOSIS — M25.562 LEFT KNEE PAIN: ICD-10-CM

## 2024-10-18 PROCEDURE — 97110 THERAPEUTIC EXERCISES: CPT | Mod: GP | Performed by: PHYSICAL THERAPIST

## 2024-10-18 PROCEDURE — 97016 VASOPNEUMATIC DEVICE THERAPY: CPT | Mod: GP | Performed by: PHYSICAL THERAPIST

## 2024-10-18 PROCEDURE — 97116 GAIT TRAINING THERAPY: CPT | Mod: GP | Performed by: PHYSICAL THERAPIST

## 2024-10-18 NOTE — PROGRESS NOTES
"PHYSICAL THERAPY   TREATMENT NOTE    Patient Name:  Dot Sevilla   Patient MRN: [id number]  Date: 10/18/2024    Time Calculation  Start Time: 1120  Stop Time: 1205  Time Calculation (min): 45 min    Insurance:  Insurance Type: [employer] Dual Complete  Visit number: 3    [place]ved # of visits [place]  Authorization Needed: no      General   Reason for visit: L Lateral meniscectomy   DOS 8/26/24  Referred by: Jules Cotter    Therapy diagnoses:   Problem List Items Addressed This Visit             ICD-10-CM       Musculoskeletal and Injuries    Left knee pain M25.562       Assessment:    Pt does well with exercises and reports decreased pain following exercise and modalities today.  Patient required gait training with single point cane and step negotiation. She instructed on activity tolerance and modification for pain reduction. Pt. Education and handout provided for making home gel/ice pack.        Plan:  Continue to work on LE stabilization, knee ROM, and overall strength.     Subjective  Pt states her knee is really hurting today but felt better after her last PT session. Has been working on the exercises from last visit.  Patient reports some times she is pain free.     - Pain (0-10): 10/10    Precautions:    PMH: DM II, HTN, asthma, obesity, gout  Fall risk -  moderate    Objective    Treatment Performed:     Therapeutic Exercise:    - Nustep L3 x 8 minutes   - standing B HR 10 x 2   - standing marching 10x R/L x 2-np  - HC stretch slant board 30\" x 2-np  - seated Left LAQ in available range x 10**  - supine QS left 5\" x 10  - supine SLR left 10x -np  - SB supine knee flexion 20x -np  - bridge 10x -np  - heel slides left 10x -np  - side SLR 10x left -np  - sitting hamstring stretch 20\" x 2 R/L       Manual Therapy:     Neuromuscular Re-education:     Gait Training:  [15] min  Gait training with SPC to reduce knee pain, patient will resume ambulating with cane at home  Step negotiation 2 x 12 with Left rail, " single step to gait pattern for safe and symmetrical step negotiation at home    Modalities: minutes  - Game ready 34 degrees mod compression - 10 minutes      PT Therapeutic Procedures Time Entry  Therapeutic Exercise Time Entry: 20  Gait Training Time Entry: 15  PT Modalities Time Entry  Vasopneumatic Devices Time Entry: 10

## 2024-10-23 ENCOUNTER — DOCUMENTATION (OUTPATIENT)
Dept: PHYSICAL THERAPY | Facility: CLINIC | Age: 64
End: 2024-10-23
Payer: COMMERCIAL

## 2024-10-23 ENCOUNTER — CLINICAL SUPPORT (OUTPATIENT)
Dept: NUTRITION | Facility: HOSPITAL | Age: 64
End: 2024-10-23
Payer: COMMERCIAL

## 2024-10-23 NOTE — PROGRESS NOTES
Physical Therapy                 Therapy Communication Note    Patient Name: Dot Sevilla  MRN: [id number]  Department: [address]  Room: Room/bed info not found  Today's Date: 10/23/2024     Discipline: Physical Therapy    Missed Visit Reason:  No Show    Missed Time: No Show    Comment:

## 2024-10-23 NOTE — PROGRESS NOTES
Food For Life  Diet Recommendation 1: Diabetes  Diet Recommendation 2: Heart Healthy  Diet Recommendation 3: Healthy Eating  Food Intolerance Avoidance: NKFA  Household Size: 4 Members (Max/[place])  Interventions: Referral Number: 3rd 6 Mo Referral 1.5 yrs (Referrals may not be consecutive)  Interventions: Visit Number: 6 of 6 Visits - Max 6 Visits/Referral Each 6 Mo Period  Education Today: Healthy Eating on a Budget  Grains: 50-75% Whole  Fruit: 50-75% Fresh  Vegetables: 25-50% Fresh  Proteins: 1-2 Plant-based Items  Dairy: Lowfat - 100%  [facility]  Initials of RD Assisting Today: YENI

## 2024-10-25 ENCOUNTER — TREATMENT (OUTPATIENT)
Dept: PHYSICAL THERAPY | Facility: CLINIC | Age: 64
End: 2024-10-25
Payer: COMMERCIAL

## 2024-10-25 DIAGNOSIS — M25.562 LEFT KNEE PAIN: ICD-10-CM

## 2024-10-25 DIAGNOSIS — M25.562 CHRONIC PAIN OF LEFT KNEE: Primary | ICD-10-CM

## 2024-10-25 DIAGNOSIS — G89.29 CHRONIC PAIN OF LEFT KNEE: Primary | ICD-10-CM

## 2024-10-25 PROCEDURE — 97016 VASOPNEUMATIC DEVICE THERAPY: CPT | Mod: GP | Performed by: PHYSICAL THERAPIST

## 2024-10-25 PROCEDURE — 97110 THERAPEUTIC EXERCISES: CPT | Mod: GP | Performed by: PHYSICAL THERAPIST

## 2024-10-25 NOTE — PROGRESS NOTES
"PHYSICAL THERAPY   TREATMENT NOTE    Patient Name:  Dot Sevilla   Patient MRN: [id number]  Date: 10/25/2024    Time Calculation  Start Time: 1049  Stop Time: 1140  Time Calculation (min): 51 min    Insurance:  Insurance Type: [employer] Dual Complete  Visit number: 4    Approved # of visits [place]  Authorization Needed: no    General   Reason for visit: L Lateral meniscectomy   DOS 8/26/24  Referred by: Jules Cotter    Therapy diagnoses:   Problem List Items Addressed This Visit             ICD-10-CM    Chronic pain of left knee - Primary M25.562, G89.29     Other Visit Diagnoses         Codes    Left knee pain     M25.562            Assessment:    Pt doing much better today vs last visit with improved tolerance for exercises and significantly decreased pain. She does fatigue with new exercises today.     Plan:  Continue to work on LE stabilization, knee ROM, and overall strength.       Subjective  Feeling much better and walking better. Has noticed a lot of popping in her knee that does hurt, not with specific motions, mostly in the front and on the outside of the knee. Has only been using her cane sometimes.  - Pain (0-10): 4/10 L anterior/lateral knee    Precautions:    PMH: DM II, HTN, asthma, obesity, gout  Fall risk -  moderate    Objective  HEP Access Code: [email or web address] (quad sets, SLR, heel slide with strap, bridge, SLS)    Treatment Performed:   Therapeutic Exercise:    - R Stepper L4 x 8'  - gastroc stretch x 30\" R/L x 2  - B heel raises x 10 x 2  - shuttle leg press DL 50# x 10 // SL 25# x 10 R/L x 2  - stool scoots x 30' x 3  - airex march x 20 with UE assist // x 20 without UE assist  - standing hip abd x 10 R/L x 2  - standing hip ext x 10 R/L x 2  - long sit hamstring stretch x 30\" R/L x 2  - supine hip flexor stretch x 60\" R/L    Modalities: minutes  - Game ready 34 degrees high compression - 10 minutes      PT Therapeutic Procedures Time Entry  Therapeutic Exercise Time Entry: 41  PT Modalities Time " Entry  Vasopneumatic Devices Time Entry: 10

## 2024-10-30 ENCOUNTER — APPOINTMENT (OUTPATIENT)
Dept: PHYSICAL THERAPY | Facility: CLINIC | Age: 64
End: 2024-10-30
Payer: COMMERCIAL

## 2024-10-30 ENCOUNTER — DOCUMENTATION (OUTPATIENT)
Dept: PHYSICAL THERAPY | Facility: CLINIC | Age: 64
End: 2024-10-30
Payer: COMMERCIAL

## 2024-11-01 ENCOUNTER — APPOINTMENT (OUTPATIENT)
Dept: PHYSICAL THERAPY | Facility: CLINIC | Age: 64
End: 2024-11-01
Payer: COMMERCIAL

## 2024-11-06 ENCOUNTER — DOCUMENTATION (OUTPATIENT)
Dept: PHYSICAL THERAPY | Facility: CLINIC | Age: 64
End: 2024-11-06
Payer: COMMERCIAL

## 2024-11-06 NOTE — PROGRESS NOTES
Physical Therapy                 Therapy Communication Note    Patient Name: Dot Sevilla  MRN: [id number]  Department:   Room: Room/bed info not found  Today's Date: 11/6/2024     Discipline: Physical Therapy    Missed Visit Reason:   called to cx appts regarding cx/no show policy  mailbox full    Missed Time: No Show

## 2024-11-08 ENCOUNTER — TREATMENT (OUTPATIENT)
Dept: PHYSICAL THERAPY | Facility: CLINIC | Age: 64
End: 2024-11-08
Payer: COMMERCIAL

## 2024-11-08 DIAGNOSIS — G89.29 CHRONIC PAIN OF LEFT KNEE: Primary | ICD-10-CM

## 2024-11-08 DIAGNOSIS — M25.562 LEFT KNEE PAIN: ICD-10-CM

## 2024-11-08 DIAGNOSIS — M25.562 CHRONIC PAIN OF LEFT KNEE: Primary | ICD-10-CM

## 2024-11-08 PROCEDURE — 97016 VASOPNEUMATIC DEVICE THERAPY: CPT | Mod: GP | Performed by: PHYSICAL THERAPIST

## 2024-11-08 PROCEDURE — 97110 THERAPEUTIC EXERCISES: CPT | Mod: GP | Performed by: PHYSICAL THERAPIST

## 2024-11-08 NOTE — PROGRESS NOTES
"PHYSICAL THERAPY   TREATMENT NOTE    Patient Name:  Dot Sevilla   Patient MRN: [id number]  Date: 11/8/2024    Time Calculation  Start Time: 1015  Stop Time: 1109  Time Calculation (min): 54 min    Insurance:  Insurance Type: [employer] Dual Complete  Visit number: 5    Approved # of visits [place]  Authorization Needed: no    General   Reason for visit: L Lateral meniscectomy   DOS 8/26/24  Referred by: Jules Cotter    Therapy diagnoses:   Problem List Items Addressed This Visit             ICD-10-CM    Chronic pain of left knee - Primary M25.562, G89.29     Other Visit Diagnoses         Codes    Left knee pain     M25.562            Assessment:    Pt challenged with quad strength exercises today, unable to do 4\" step while bending her knee on L side.     Plan:  Continue to work on quad strength, LE strength, pain control, balance      Subjective  Pain was worse last night and she couldn't sleep. Took some ibuprofen but it didn't do much. Doing her HEP twice a day. Stairs are still hard for her  - Pain (0-10): 5/10 L anterior/lateral knee    Precautions:    PMH: DM II, HTN, asthma, obesity, gout  Fall risk -  moderate    Objective  HEP Access Code: [email or web address] (quad sets, SLR, heel slide with strap, bridge, SLS)    Treatment Performed:   Therapeutic Exercise:    - R Stepper L4 x 8'  - gastroc stretch on slant board x 30\" x 2  - 6\" step ups x 10 R / 2\" step ups x 10 L *unable to perform 4\" on L  - 25# SL shuttle leg press x 10 R/L x 2 // 31# SL press x 10 R/L  - SB DKTC stretch x 2'  - SB bridges x 10  - SLR with QS x 10 L/R x 2  - prone hs curls x 10 R/L   - prone hip extension x 15 R/L    Modalities: minutes  - Game ready 34 degrees high compression - 10 minutes      PT Therapeutic Procedures Time Entry  Therapeutic Exercise Time Entry: 44  PT Modalities Time Entry  Vasopneumatic Devices Time Entry: 10                "

## 2024-11-13 ENCOUNTER — APPOINTMENT (OUTPATIENT)
Dept: PRIMARY CARE | Facility: CLINIC | Age: 64
End: 2024-11-13
Payer: COMMERCIAL

## 2024-11-13 ENCOUNTER — APPOINTMENT (OUTPATIENT)
Dept: PHYSICAL THERAPY | Facility: CLINIC | Age: 64
End: 2024-11-13
Payer: COMMERCIAL

## 2024-11-15 ENCOUNTER — APPOINTMENT (OUTPATIENT)
Dept: PHYSICAL THERAPY | Facility: CLINIC | Age: 64
End: 2024-11-15
Payer: COMMERCIAL

## 2024-11-18 ENCOUNTER — APPOINTMENT (OUTPATIENT)
Dept: PRIMARY CARE | Facility: CLINIC | Age: 64
End: 2024-11-18
Payer: COMMERCIAL

## 2024-11-18 VITALS
HEIGHT: 65 IN | SYSTOLIC BLOOD PRESSURE: 130 MMHG | HEART RATE: 79 BPM | WEIGHT: 227 LBS | DIASTOLIC BLOOD PRESSURE: 77 MMHG | TEMPERATURE: 98 F | OXYGEN SATURATION: 96 % | BODY MASS INDEX: 37.82 KG/M2

## 2024-11-18 DIAGNOSIS — I10 BENIGN ESSENTIAL HYPERTENSION: ICD-10-CM

## 2024-11-18 DIAGNOSIS — J45.909 ASTHMA, UNSPECIFIED ASTHMA SEVERITY, UNSPECIFIED WHETHER COMPLICATED, UNSPECIFIED WHETHER PERSISTENT (HHS-HCC): ICD-10-CM

## 2024-11-18 DIAGNOSIS — E11.9 DIABETES MELLITUS TYPE 2 WITHOUT RETINOPATHY (MULTI): ICD-10-CM

## 2024-11-18 DIAGNOSIS — R06.09 DYSPNEA ON EXERTION: ICD-10-CM

## 2024-11-18 DIAGNOSIS — E78.5 HYPERLIPIDEMIA, UNSPECIFIED HYPERLIPIDEMIA TYPE: ICD-10-CM

## 2024-11-18 DIAGNOSIS — T78.40XD ALLERGY, SUBSEQUENT ENCOUNTER: ICD-10-CM

## 2024-11-18 LAB — POC HEMOGLOBIN A1C: 6.2 % (ref 4.2–6.5)

## 2024-11-18 PROCEDURE — 1036F TOBACCO NON-USER: CPT | Performed by: FAMILY MEDICINE

## 2024-11-18 PROCEDURE — 3078F DIAST BP <80 MM HG: CPT | Performed by: FAMILY MEDICINE

## 2024-11-18 PROCEDURE — 83036 HEMOGLOBIN GLYCOSYLATED A1C: CPT | Performed by: FAMILY MEDICINE

## 2024-11-18 PROCEDURE — 3048F LDL-C <100 MG/DL: CPT | Performed by: FAMILY MEDICINE

## 2024-11-18 PROCEDURE — G0008 ADMIN INFLUENZA VIRUS VAC: HCPCS | Performed by: FAMILY MEDICINE

## 2024-11-18 PROCEDURE — 3044F HG A1C LEVEL LT 7.0%: CPT | Performed by: FAMILY MEDICINE

## 2024-11-18 PROCEDURE — 3008F BODY MASS INDEX DOCD: CPT | Performed by: FAMILY MEDICINE

## 2024-11-18 PROCEDURE — 99214 OFFICE O/P EST MOD 30 MIN: CPT | Performed by: FAMILY MEDICINE

## 2024-11-18 PROCEDURE — 1159F MED LIST DOCD IN RCRD: CPT | Performed by: FAMILY MEDICINE

## 2024-11-18 PROCEDURE — 1125F AMNT PAIN NOTED PAIN PRSNT: CPT | Performed by: FAMILY MEDICINE

## 2024-11-18 PROCEDURE — 90662 IIV NO PRSV INCREASED AG IM: CPT | Performed by: FAMILY MEDICINE

## 2024-11-18 PROCEDURE — 3075F SYST BP GE 130 - 139MM HG: CPT | Performed by: FAMILY MEDICINE

## 2024-11-18 PROCEDURE — 4010F ACE/ARB THERAPY RXD/TAKEN: CPT | Performed by: FAMILY MEDICINE

## 2024-11-18 RX ORDER — FAMOTIDINE 20 MG/1
20 TABLET, FILM COATED ORAL DAILY PRN
Qty: 90 TABLET | Refills: 3 | Status: SHIPPED | OUTPATIENT
Start: 2024-11-18

## 2024-11-18 RX ORDER — ATENOLOL AND CHLORTHALIDONE TABLET 50; 25 MG/1; MG/1
1 TABLET ORAL DAILY
Qty: 90 TABLET | Refills: 3 | Status: SHIPPED | OUTPATIENT
Start: 2024-11-18

## 2024-11-18 RX ORDER — ATORVASTATIN CALCIUM 40 MG/1
40 TABLET, FILM COATED ORAL DAILY
Qty: 90 TABLET | Refills: 3 | Status: SHIPPED | OUTPATIENT
Start: 2024-11-18 | End: 2025-11-18

## 2024-11-18 RX ORDER — DULAGLUTIDE 1.5 MG/.5ML
1.5 INJECTION, SOLUTION SUBCUTANEOUS
Qty: 2 ML | Refills: 11 | Status: SHIPPED | OUTPATIENT
Start: 2024-11-18

## 2024-11-18 RX ORDER — ALBUTEROL SULFATE 0.83 MG/ML
2.5 SOLUTION RESPIRATORY (INHALATION) EVERY 6 HOURS PRN
Qty: 75 ML | Refills: 11 | Status: SHIPPED | OUTPATIENT
Start: 2024-11-18

## 2024-11-18 RX ORDER — ALBUTEROL SULFATE 90 UG/1
1-2 INHALANT RESPIRATORY (INHALATION) EVERY 6 HOURS PRN
Qty: 18 G | Refills: 11 | Status: SHIPPED | OUTPATIENT
Start: 2024-11-18

## 2024-11-18 RX ORDER — IBUPROFEN 800 MG/1
400 TABLET ORAL EVERY 8 HOURS PRN
Qty: 90 TABLET | Refills: 2 | Status: SHIPPED | OUTPATIENT
Start: 2024-11-18 | End: 2025-11-18

## 2024-11-18 RX ORDER — OLMESARTAN MEDOXOMIL 5 MG/1
10 TABLET ORAL DAILY
Qty: 180 TABLET | Refills: 3 | Status: SHIPPED | OUTPATIENT
Start: 2024-11-18 | End: 2025-11-18

## 2024-11-18 ASSESSMENT — ENCOUNTER SYMPTOMS
POLYDIPSIA: 0
CONSTITUTIONAL NEGATIVE: 1
DEPRESSION: 1
NECK STIFFNESS: 0
BACK PAIN: 1
NECK PAIN: 0
POLYPHAGIA: 0
OCCASIONAL FEELINGS OF UNSTEADINESS: 1
ARTHRALGIAS: 1
LOSS OF SENSATION IN FEET: 0

## 2024-11-18 ASSESSMENT — PAIN SCALES - GENERAL: PAINLEVEL_OUTOF10: 8

## 2024-11-18 ASSESSMENT — PATIENT HEALTH QUESTIONNAIRE - PHQ9
SUM OF ALL RESPONSES TO PHQ9 QUESTIONS 1 AND 2: 0
1. LITTLE INTEREST OR PLEASURE IN DOING THINGS: NOT AT ALL
2. FEELING DOWN, DEPRESSED OR HOPELESS: NOT AT ALL

## 2024-11-18 NOTE — PROGRESS NOTES
"Subjective   Patient ID: Dot Sevilla is a 64 y.o. female who presents for Follow-up, Med Refill, and Flu Vaccine.    Ms. Sevilla is primarily here for refills for diabetes and hypertension, as well as asthma. She feels generally well, but complains of pain in her right upper back, right shoulder, and occasionally her right arm. She takes all her medication faithfully, including ibuprofen for pain. No numbness, tingling, or weakness in her hands. No neck pain. No aggravating trauma. No increased difficulty walking. No weakness in lower extremities. She otherwise feels well, with no symptoms of diabetes.     Med Refill  Associated symptoms include arthralgias. Pertinent negatives include no neck pain.        Review of Systems   Constitutional: Negative.    Endocrine: Negative for polydipsia and polyphagia.   Musculoskeletal:  Positive for arthralgias and back pain. Negative for gait problem, neck pain and neck stiffness.       Objective   /77 (BP Location: Right arm, Patient Position: Sitting)   Pulse 79   Temp 36.7 °C (98 °F) (Temporal)   Ht 1.651 m (5' 5\")   Wt 103 kg (227 lb)   SpO2 96%   BMI 37.77 kg/m²     Physical Exam  Cardiovascular:      Rate and Rhythm: Normal rate and regular rhythm.   Pulmonary:      Effort: Pulmonary effort is normal.      Breath sounds: Normal breath sounds.   Musculoskeletal:      Right shoulder: Normal.      Left shoulder: Normal.      Right upper arm: Normal.      Left upper arm: Normal.      Cervical back: No swelling or tenderness. Normal range of motion.      Thoracic back: Normal.      Lumbar back: Normal.         Assessment/Plan :  Likely myofascial strain with some radiating pain to shoulders. HbA1C% (point of care) today. She requested a seat lift mechanism which I ordered, and will await next steps. Refilled all needed meds. Influenza vaccine today. F/U with Dr. Lisa in 3 months. Diabetes has been generally under control.  Diagnoses and all orders for this " visit:  Asthma, unspecified asthma severity, unspecified whether complicated, unspecified whether persistent ([facility])  -     albuterol 2.5 mg /3 mL (0.083 %) nebulizer solution; Take 3 mL (2.5 mg) by nebulization every 6 hours if needed for wheezing. USE 1 UNIT DOSE IN NEBULIZER EVERY 4 HOURS AS NEEDED.  -     albuterol 90 mcg/actuation inhaler; Inhale 1-2 puffs every 6 hours if needed for wheezing or shortness of breath.  Allergy, subsequent encounter  Diabetes mellitus type 2 without retinopathy (Multi)  -     dulaglutide (Trulicity) 1.5 mg/0.5 mL pen injector injection; Inject 1.5 mg under the skin 1 (one) time per week.  -     ibuprofen 800 mg tablet; Take 0.5 tablets (400 mg) by mouth every 8 hours if needed for moderate pain (4 - 6).  -     POCT glycosylated hemoglobin (Hb A1C) manually resulted  -     Seat lift mechanism incorporated into a combination lift-chair mechanism  Benign essential hypertension  -     famotidine (Pepcid) 20 mg tablet; Take 1 tablet (20 mg) by mouth once daily as needed for heartburn. hearatburn  -     olmesartan (Benicar) 5 mg tablet; Take 2 tablets (10 mg) by mouth once daily.  -     atenoloL-chlorthalidone (Tenoretic) 50-25 mg tablet; Take 1 tablet by mouth once daily. As directed  Dyspnea on exertion  -     olmesartan (Benicar) 5 mg tablet; Take 2 tablets (10 mg) by mouth once daily.  Hyperlipidemia, unspecified hyperlipidemia type  -     atorvastatin (Lipitor) 40 mg tablet; Take 1 tablet (40 mg) by mouth once daily.  Other orders  -     Flu vaccine, trivalent, preservative free, HIGH-DOSE, age 65y+ (Fluzone)

## 2024-11-19 ENCOUNTER — APPOINTMENT (OUTPATIENT)
Dept: ORTHOPEDIC SURGERY | Facility: CLINIC | Age: 64
End: 2024-11-19
Payer: COMMERCIAL

## 2024-11-19 DIAGNOSIS — S83.282S TEAR OF LATERAL MENISCUS OF LEFT KNEE, CURRENT, UNSPECIFIED TEAR TYPE, SEQUELA: Primary | ICD-10-CM

## 2024-11-19 PROCEDURE — 99024 POSTOP FOLLOW-UP VISIT: CPT | Performed by: ORTHOPAEDIC SURGERY

## 2024-11-19 PROCEDURE — 1159F MED LIST DOCD IN RCRD: CPT | Performed by: ORTHOPAEDIC SURGERY

## 2024-11-19 PROCEDURE — 20610 DRAIN/INJ JOINT/BURSA W/O US: CPT | Performed by: ORTHOPAEDIC SURGERY

## 2024-11-19 PROCEDURE — 1036F TOBACCO NON-USER: CPT | Performed by: ORTHOPAEDIC SURGERY

## 2024-11-19 PROCEDURE — 4010F ACE/ARB THERAPY RXD/TAKEN: CPT | Performed by: ORTHOPAEDIC SURGERY

## 2024-11-19 PROCEDURE — 3044F HG A1C LEVEL LT 7.0%: CPT | Performed by: ORTHOPAEDIC SURGERY

## 2024-11-19 PROCEDURE — 3048F LDL-C <100 MG/DL: CPT | Performed by: ORTHOPAEDIC SURGERY

## 2024-11-19 RX ORDER — METHYLPREDNISOLONE ACETATE 40 MG/ML
40 INJECTION, SUSPENSION INTRA-ARTICULAR; INTRALESIONAL; INTRAMUSCULAR; SOFT TISSUE
Status: COMPLETED | OUTPATIENT
Start: 2024-11-19 | End: 2024-11-19

## 2024-11-19 RX ORDER — LIDOCAINE HYDROCHLORIDE 20 MG/ML
2 INJECTION, SOLUTION INFILTRATION; PERINEURAL
Status: COMPLETED | OUTPATIENT
Start: 2024-11-19 | End: 2024-11-19

## 2024-11-19 NOTE — PROGRESS NOTES
Patient is now 2 and half months status post arthroscopic lateral meniscectomy she had a known chondral lesion lateral femoral condyle  Chondroplasty was performed as well knee feels a bit tight and sore  Examination incisions are healed nicely small effusion full range of motion tender laterally assessment status post lateral meniscectomy  Arthritis injected knee today  L Inj/Asp: L knee on 11/19/2024 8:58 AM  Indications: pain  Details: 21 G needle, lateral approach  Medications: 40 mg methylPREDNISolone acetate 40 mg/mL; 2 mL lidocaine 20 mg/mL (2 %)

## 2024-11-20 ENCOUNTER — APPOINTMENT (OUTPATIENT)
Dept: PHYSICAL THERAPY | Facility: CLINIC | Age: 64
End: 2024-11-20
Payer: COMMERCIAL

## 2024-11-26 ENCOUNTER — CLINICAL SUPPORT (OUTPATIENT)
Dept: NUTRITION | Facility: HOSPITAL | Age: 64
End: 2024-11-26
Payer: COMMERCIAL

## 2024-11-26 NOTE — PROGRESS NOTES
[employer] For Life  Diet Recommendation 1: Diabetes  Diet Recommendation 2: Heart Healthy  Diet Recommendation 3: Healthy Eating  Food Intolerance Avoidance: NKFA  Household Size: 5 Members (4 Max/Household)  Interventions: Referral Number: 4th 6 Mo Referral 2 yrs (Referrals may not be consecutive)  Interventions: Visit Number: 1 of 6 Visits - Max 6 Visits/Referral Each 6 Mo Period  Education Today: Healthy Eating on a Budget  Follow Up Notes for Future Visits: Proxy came for quick pickup. Needs new referral.  Grains: 25-50% Whole  Fruit: % Fresh  Vegetables: 50-75% Fresh  Proteins: 1-2 Plant-based Items  Dairy: 25-50% Lowfat  Originating Site of Referral Order: [facility]  Initials of RD Assisting Today: YENI

## 2024-11-29 DIAGNOSIS — G47.00 INSOMNIA, UNSPECIFIED TYPE: ICD-10-CM

## 2024-11-29 DIAGNOSIS — J45.909 ASTHMA, UNSPECIFIED ASTHMA SEVERITY, UNSPECIFIED WHETHER COMPLICATED, UNSPECIFIED WHETHER PERSISTENT (HHS-HCC): ICD-10-CM

## 2024-11-30 RX ORDER — MONTELUKAST SODIUM 10 MG/1
10 TABLET ORAL NIGHTLY
Qty: 90 TABLET | Refills: 1 | Status: SHIPPED | OUTPATIENT
Start: 2024-11-30

## 2024-11-30 RX ORDER — TRAZODONE HYDROCHLORIDE 100 MG/1
100 TABLET ORAL NIGHTLY
Qty: 30 TABLET | Refills: 0 | Status: SHIPPED | OUTPATIENT
Start: 2024-11-30 | End: 2024-12-30

## 2024-11-30 RX ORDER — BUDESONIDE AND FORMOTEROL FUMARATE DIHYDRATE 160; 4.5 UG/1; UG/1
AEROSOL RESPIRATORY (INHALATION)
Qty: 10.2 G | Refills: 11 | Status: SHIPPED | OUTPATIENT
Start: 2024-11-30

## 2025-01-06 ENCOUNTER — CLINICAL SUPPORT (OUTPATIENT)
Dept: NUTRITION | Facility: HOSPITAL | Age: 65
End: 2025-01-06
Payer: COMMERCIAL

## 2025-01-06 NOTE — PROGRESS NOTES
[employer] For [employer]  Diet Recommendation 1: Diabetes  Diet Recommendation 2: Heart Healthy  Diet Recommendation 3: Healthy Eating  Food Intolerance Avoidance: NKFA  Household Size: 5 Members (4 Max/Household)  Interventions: Referral Number: 4th 6 Mo Referral 2 yrs (Referrals may not be consecutive)  Interventions: Visit Number: 3 of 6 Visits - Max 6 Visits/Referral Each 6 Mo Period  Education Today: [employer]  Follow Up Notes for Future Visits: Proxy (sister) came today  Grains: 25-50% Whole  Fruit: 50-75% Fresh  Vegetables: 50-75% Fresh  Proteins: 0 Plant-based Items  Dairy: 0-25% Lowfat  Originating Site of Referral Order: [facility]  Initials of RD Assisting Today: [place]

## 2025-02-12 ENCOUNTER — DOCUMENTATION (OUTPATIENT)
Dept: PHYSICAL THERAPY | Facility: CLINIC | Age: 65
End: 2025-02-12
Payer: COMMERCIAL

## 2025-02-12 DIAGNOSIS — J45.909 ASTHMA, UNSPECIFIED ASTHMA SEVERITY, UNSPECIFIED WHETHER COMPLICATED, UNSPECIFIED WHETHER PERSISTENT (HHS-HCC): ICD-10-CM

## 2025-02-12 NOTE — PROGRESS NOTES
Physical Therapy    Discharge Summary    Name: Dot Sevilla  MRN: [id number]  : 1960  Date: 25    Discharge Summary: PT    Discharge Information:   Date of discharge 25  Date of last visit 24  Date of evaluation 10/2/24  Number of attended visits 5  Referred by Vahe  Referred for L lateral meniscectomy    Rehab Discharge Reason:  Lost to follow up

## 2025-02-13 ENCOUNTER — CLINICAL SUPPORT (OUTPATIENT)
Facility: HOSPITAL | Age: 65
End: 2025-02-13
Payer: COMMERCIAL

## 2025-02-13 RX ORDER — BUDESONIDE AND FORMOTEROL FUMARATE DIHYDRATE 160; 4.5 UG/1; UG/1
AEROSOL RESPIRATORY (INHALATION)
Qty: 10.2 G | Refills: 11 | Status: SHIPPED | OUTPATIENT
Start: 2025-02-13 | End: 2025-02-14 | Stop reason: SDUPTHER

## 2025-02-13 NOTE — PROGRESS NOTES
[employer]  Diet Recommendation 1: Healthy Eating  Food Intolerance Avoidance: NKFA  Household Size: 5 Members (4 Max/Household)  Interventions: Referral Number: 4th 6 Mo Referral 2 yrs (Referrals may not be consecutive)  Interventions: Visit Number: 4 of 6 Visits - Max 6 Visits/Referral Each 6 Mo Period  Grains: 25-50% Whole  Fruit: 50-75% Fresh  Vegetables: 25-50% Fresh  Proteins: 1-2 Plant-based Items  Dairy: 0-25% Lowfat  Originating Site of Referral Order: [facility]  Initials of RD Assisting Today: DESIREE

## 2025-02-14 ENCOUNTER — OFFICE VISIT (OUTPATIENT)
Facility: HOSPITAL | Age: 65
End: 2025-02-14
Payer: COMMERCIAL

## 2025-02-14 VITALS
HEART RATE: 84 BPM | DIASTOLIC BLOOD PRESSURE: 86 MMHG | SYSTOLIC BLOOD PRESSURE: 145 MMHG | HEIGHT: 65 IN | BODY MASS INDEX: 38.49 KG/M2 | OXYGEN SATURATION: 99 % | WEIGHT: 231 LBS

## 2025-02-14 DIAGNOSIS — G47.00 INSOMNIA, UNSPECIFIED TYPE: ICD-10-CM

## 2025-02-14 DIAGNOSIS — Z00.00 MEDICARE ANNUAL WELLNESS VISIT, SUBSEQUENT: Primary | ICD-10-CM

## 2025-02-14 DIAGNOSIS — Z78.0 ENCOUNTER FOR OSTEOPOROSIS SCREENING IN ASYMPTOMATIC POSTMENOPAUSAL PATIENT: ICD-10-CM

## 2025-02-14 DIAGNOSIS — E11.9 DIABETES MELLITUS TYPE 2 WITHOUT RETINOPATHY (MULTI): ICD-10-CM

## 2025-02-14 DIAGNOSIS — E78.5 HYPERLIPIDEMIA, UNSPECIFIED HYPERLIPIDEMIA TYPE: ICD-10-CM

## 2025-02-14 DIAGNOSIS — I10 BENIGN ESSENTIAL HYPERTENSION: ICD-10-CM

## 2025-02-14 DIAGNOSIS — R05.3 CHRONIC COUGH: ICD-10-CM

## 2025-02-14 DIAGNOSIS — Z13.820 ENCOUNTER FOR OSTEOPOROSIS SCREENING IN ASYMPTOMATIC POSTMENOPAUSAL PATIENT: ICD-10-CM

## 2025-02-14 DIAGNOSIS — F33.0 MILD EPISODE OF RECURRENT MAJOR DEPRESSIVE DISORDER (CMS-HCC): ICD-10-CM

## 2025-02-14 DIAGNOSIS — T78.40XD ALLERGY, SUBSEQUENT ENCOUNTER: ICD-10-CM

## 2025-02-14 DIAGNOSIS — M10.9 GOUT OF ANKLE, UNSPECIFIED CAUSE, UNSPECIFIED CHRONICITY, UNSPECIFIED LATERALITY: ICD-10-CM

## 2025-02-14 DIAGNOSIS — Z12.31 ENCOUNTER FOR SCREENING MAMMOGRAM FOR MALIGNANT NEOPLASM OF BREAST: ICD-10-CM

## 2025-02-14 DIAGNOSIS — J45.909 ASTHMA, UNSPECIFIED ASTHMA SEVERITY, UNSPECIFIED WHETHER COMPLICATED, UNSPECIFIED WHETHER PERSISTENT (HHS-HCC): ICD-10-CM

## 2025-02-14 RX ORDER — BENZONATATE 100 MG/1
100 CAPSULE ORAL 3 TIMES DAILY PRN
Qty: 42 CAPSULE | Refills: 0 | Status: SHIPPED | OUTPATIENT
Start: 2025-02-14 | End: 2025-03-16

## 2025-02-14 RX ORDER — FLUTICASONE PROPIONATE 50 MCG
1-2 SPRAY, SUSPENSION (ML) NASAL DAILY
Qty: 16 G | Refills: 0 | Status: SHIPPED | OUTPATIENT
Start: 2025-02-14

## 2025-02-14 RX ORDER — MONTELUKAST SODIUM 10 MG/1
10 TABLET ORAL DAILY
Qty: 90 TABLET | Refills: 1 | Status: SHIPPED | OUTPATIENT
Start: 2025-02-14

## 2025-02-14 RX ORDER — DULAGLUTIDE 1.5 MG/.5ML
1.5 INJECTION, SOLUTION SUBCUTANEOUS
Qty: 2 ML | Refills: 11 | Status: SHIPPED | OUTPATIENT
Start: 2025-02-16

## 2025-02-14 RX ORDER — ALBUTEROL SULFATE 90 UG/1
1-2 INHALANT RESPIRATORY (INHALATION) EVERY 6 HOURS PRN
Qty: 18 G | Refills: 11 | Status: SHIPPED | OUTPATIENT
Start: 2025-02-14

## 2025-02-14 RX ORDER — FAMOTIDINE 20 MG/1
20 TABLET, FILM COATED ORAL DAILY PRN
Qty: 90 TABLET | Refills: 3 | Status: SHIPPED | OUTPATIENT
Start: 2025-02-14

## 2025-02-14 RX ORDER — AMLODIPINE BESYLATE 5 MG/1
5 TABLET ORAL DAILY
Qty: 30 TABLET | Refills: 5 | Status: SHIPPED | OUTPATIENT
Start: 2025-02-14 | End: 2025-08-13

## 2025-02-14 RX ORDER — ALLOPURINOL 100 MG/1
100 TABLET ORAL DAILY
Qty: 30 TABLET | Refills: 11 | Status: SHIPPED | OUTPATIENT
Start: 2025-02-14 | End: 2026-02-14

## 2025-02-14 RX ORDER — BUDESONIDE AND FORMOTEROL FUMARATE DIHYDRATE 160; 4.5 UG/1; UG/1
AEROSOL RESPIRATORY (INHALATION)
Qty: 10.2 G | Refills: 11 | Status: SHIPPED | OUTPATIENT
Start: 2025-02-14

## 2025-02-14 RX ORDER — ATENOLOL AND CHLORTHALIDONE TABLET 50; 25 MG/1; MG/1
1 TABLET ORAL DAILY
Qty: 90 TABLET | Refills: 3 | Status: SHIPPED | OUTPATIENT
Start: 2025-02-14 | End: 2025-02-14 | Stop reason: WASHOUT

## 2025-02-14 RX ORDER — ATORVASTATIN CALCIUM 40 MG/1
40 TABLET, FILM COATED ORAL DAILY
Qty: 90 TABLET | Refills: 3 | Status: SHIPPED | OUTPATIENT
Start: 2025-02-14 | End: 2026-02-14

## 2025-02-14 RX ORDER — TRAZODONE HYDROCHLORIDE 100 MG/1
100 TABLET ORAL NIGHTLY
Qty: 30 TABLET | Refills: 0 | Status: SHIPPED | OUTPATIENT
Start: 2025-02-14 | End: 2025-03-16

## 2025-02-14 RX ORDER — ATENOLOL 50 MG/1
50 TABLET ORAL DAILY
Qty: 30 TABLET | Refills: 5 | Status: SHIPPED | OUTPATIENT
Start: 2025-02-14 | End: 2025-02-14 | Stop reason: WASHOUT

## 2025-02-14 RX ORDER — ATENOLOL AND CHLORTHALIDONE TABLET 50; 25 MG/1; MG/1
1 TABLET ORAL DAILY
Qty: 90 TABLET | Refills: 3 | Status: CANCELLED | OUTPATIENT
Start: 2025-02-14

## 2025-02-14 NOTE — PROGRESS NOTES
Subjective   Reason for Visit: Dot Sevilla is an 64 y.o. female here for a Medicare Wellness visit.     [unfilled]    # Health Maintenance  - Dental Care: last prior dental visit edentulous, brush gums daily  - Vision: last prior ophtho visit last year // corrective devices: reading glasses // recent vision: none  - Hearing: denies recent hearing loss   - Diet: Breakfast: sher, egg, toast Dinner:   fruits: grapes, banana Vegetables: broccoli spinach. Beverages: pop 1 daily, water  - Exercise: going to physical therapy physical therapy for 3 weeks and continue at home  - Weight: increasing,   - Smoking: never a smoker  - EtOH: Alcohol Use: No, patient does not drink alcohol.  - Illicit substances: denied.  - Employment: Retired, play games on tablet, fear of falling  - Living Situation: Alone, house  - Ambulate with cane & rollator  - Colon CA: last prior screening none- DUE// family h/o colon ca? Yes, describe:    Aunt with colon cancer    WOMEN  - Menstrual Status: Menopausal, 2005 total hystorectomy  - Pregnancy history:   - Bladder dysfunction? has not had any episodes of incontinence  - Cervical CA: last prior pap  // h/o abnormal pap? No  - Breast CA: last prior mammo 2024 // h/o abnormal mammo? No      Phq2: postive--> PHQ9: 6 points, mild depression        FamHx:  Family History   Problem Relation Name Age of Onset    Other (diabetes mellitus) Mother      Hypertension Mother      Other (stroke syndrome) Mother      Uterine cancer Mother      Other (diabetes mellitus) Father      Heart disease Father      Other (stroke syndrome) Father      Throat cancer Sister      Breast cancer Daughter          ? age-younger than 40    Colon cancer Mother's Sister         PMH:  Patient Active Problem List   Diagnosis    Allergic rhinitis    Amblyopia, right eye    Asthma    Atypical chest pain    Bacterial vaginosis    Bad odor of urine    Benign essential hypertension    Bruising    Chronic cough     Chronic gout of right hip    Combined form of senile cataract of both eyes    Congenital hypertrophy of retinal pigment epithelium    Constipation    Depression with anxiety    Derangement of medial meniscus of right knee    Diabetes mellitus type 2 without retinopathy (Multi)    Diabetic neuropathy (Multi)    Dysuria    Exotropia, right eye    GERD (gastroesophageal reflux disease)    Glaucoma suspect, both eyes    Gout    Insomnia    Intertrigo    Back pain    Left hip pain    Left leg pain    Left shoulder pain    MGD (meibomian gland disease)    Obesity (BMI 30-39.9)    Primary localized osteoarthrosis of right lower leg    Pseudophakia    Radiculopathy of leg    Right knee pain    Stress    UTI (urinary tract infection)    UTI symptoms    Vaginal discharge    Vaginal itching    Weakness of left lower extremity    Yeast infection    Retinal hole or tear, right    Family history of breast cancer    Family history of colon cancer    Family history of lung cancer    Family history of pancreatic cancer    Dyspnea on exertion    Lethargy    Microcytic anemia    Dyslipidemia associated with type 2 diabetes mellitus (Multi)    Complex tear of lateral meniscus of left knee as current injury    Chronic pain of left knee     Past Medical History:   Diagnosis Date    Asthma     Cardiology follow-up encounter 04/25/2024    Marshall Hoang DO    Complex tear of lateral meniscus of left knee as current injury, initial encounter     Plan: Left Knee Arthroscopy; Lateral Meniscectomy 8/26/24    Depression with anxiety     Diabetic neuropathy (Multi)     GERD (gastroesophageal reflux disease)     Glaucoma suspect, both eyes     Gout     Hypertension     Insomnia     Obesity     Type 2 diabetes mellitus     4/23/24 A1C 8.1%       SurgHx:  Past Surgical History:   Procedure Laterality Date    CATARACT EXTRACTION Bilateral 2022    HYSTERECTOMY  2005    OOPHORECTOMY  2005       Meds:  Current Outpatient Medications   Medication  "Instructions    albuterol 90 mcg/actuation inhaler 1-2 puffs, inhalation, Every 6 hours PRN    albuterol 2.5 mg, nebulization, Every 6 hours PRN, USE 1 UNIT DOSE IN NEBULIZER EVERY 4 HOURS AS NEEDED.    allopurinol (ZYLOPRIM) 100 mg, oral, Daily    amLODIPine (NORVASC) 5 mg, oral, Daily    atorvastatin (LIPITOR) 40 mg, oral, Daily    benzonatate (TESSALON) 100 mg, oral, 3 times daily PRN, Do not crush or chew.    colchicine 0.6 mg tablet TAKE 1 TABLET (0.6 MG) BY MOUTH 2 TIMES A DAY. TAKE 1 TABLET 3 TIMES DAILY AS NEEDED GOUT FLARE UP    diaper,brief,adult,disposable (Depend Silhouette Women L/XL) misc 1 each, miscellaneous, Daily PRN, Use one each as needed for urinary frequency    famotidine (PEPCID) 20 mg, oral, Daily PRN, hearatburn    fluticasone (Flonase) 50 mcg/actuation nasal spray 1-2 sprays, Each Nostril, Daily    ibuprofen 400 mg, oral, Every 8 hours PRN    miscellaneous medical supply misc 1 each, miscellaneous, Daily, Quad cane for right knee meniscal tear    montelukast (SINGULAIR) 10 mg, oral, Daily    Symbicort 160-4.5 mcg/actuation inhaler INHALE 2 PUFFS IN THE MORNING AND 2 PUFFS IN THE EVENING. RINSE MOUTH AFTER USE.    traMADol (Ultram) 50 mg tablet Take by mouth if needed.    traZODone (DESYREL) 100 mg, oral, Nightly, As needed    [START ON 2/16/2025] Trulicity 1.5 mg, subcutaneous, Once Weekly       Patient care team:  Patient Care Team:  Mk Lisa MD as PCP - General (Family Medicine)     ROS:  12 pt reviewed negative    Objective   Vitals:  /86   Pulse 84   Ht 1.651 m (5' 5\")   Wt 105 kg (231 lb)   SpO2 99%   BMI 38.44 kg/m²       Physical Exam    Social Drivers of Health     Tobacco Use: Low Risk  (1/12/2025)    Received [facility]    Patient History     Smoking Tobacco Use: Never     Smokeless Tobacco Use: Never     Passive Exposure: Not on file   Alcohol Use: Not on file   Financial Resource Strain: High Risk (12/10/2024)    [facility]    " [facility] SDOH Screening     In the past year, have you been unable to get any of the following when you really needed them? choose all that apply.: Clothing   Food Insecurity: Unknown (1/12/2025)    Received from [facility]    Hunger Vital Sign     Worried About Running Out of Food in the Last Year: Never true     Ran Out of Food in the Last Year: Not on file   Recent Concern: Food Insecurity - High Risk (12/13/2024)    Received from [facility] SDOH Screening     In the past 2 months, did you or others you live with eat smaller meals or skip meals because you didn't have money for food?: Yes   Transportation Needs: High Risk (12/13/2024)    Received from [facility] SDOH Screening     Has lack of transportation kept you from medical appointments, meetings, work or from getting things needed for daily living? choose all that apply.: Yes, it has kept me from non-medical meetings, appointments, work or from getting things that i need   Physical Activity: Not on File (9/19/2023)    [facility] from [facility]    Physical Activity     Physical Activity: 0   Stress: Not on File (9/19/2023)    [facility] from [facility]    Stress     Stress: 0   Social Connections: Feeling Socially Integrated (5/25/2024)    OASIS : Social Isolation     Frequency of experiencing loneliness or isolation: Never   Intimate Partner Violence: Not on file   Depression: Not at risk (11/18/2024)    PHQ-2     PHQ-2 Score: 0   Recent Concern: Depression - At risk (10/2/2024)    PHQ-2     PHQ-2 Score: 4   Housing Stability: High Risk (12/13/2024)    Received from [facility] SDOH Screening     In the past year, have you been unable to get any of the following when you really needed them? choose all that apply.: Utilities (electric, gas, and water)   Utilities: Not At Risk (1/13/2025)    Received from [facility] Utilities     Threatened with loss of utilities: No   Digital Equity: Not on file    Health Literacy: Adequate Health Literacy (5/25/2024)    OASIS : Health Literacy     Frequency of needing help to read materials from doctor or pharmacy: Never        Lab Results   Component Value Date    HGBA1C 6.4 (H) 01/12/2025         Assessment/Plan     64 y.o. female here for Medicare Annual Wellness Exam. Patient recently admitted at [facility] for gout flare. Patient was kept on amlodipine & Nephrology recommended to discontinue olmesartan, chlorthalidone and atenolol on discharge. Discussed at length the importance of patient follow up with Nephrologist following today's visit. Provided medication refills &     IMMUNIZATIONS  Immunization History   Administered Date(s) Administered    COVID-19, mRNA, LNP-S, PF, 30 mcg/0.3 mL dose 03/24/2021, 04/14/2021    Flu vaccine, quadrivalent, high-dose, preservative free, age 65y+ (FLUZONE) 01/18/2024    Flu vaccine, trivalent, preservative free, HIGH-DOSE, age 65y+ (Fluzone) 11/18/2024    Influenza, seasonal, injectable 01/24/2023    Td vaccine, age 7 years and older (TENIVAC) 10/08/2010       Ability to perform ADLs: independent, ambulate  Fall risk: high, 8 falls in the last year  Hearing impairment: absent  Home safety risk factors: Home Safety Risk Factors: No Stair Handrails    - Flu vaccine: recommended annually, 28 steps  - COVID vaccine: recommended completion of primary series and recommended boosters,   - Declined vaccines  - HTN screening: wnl today  - Food Insecurity screen: positive  - Depression: PHQ-2 positive--> PHQ6 mild depression 2/2 grief Annie roa  - Last Dental: recommended follow up every 6mo   - Last Eye exam: recommended follow up annually   - Pap smear (21-65F): hysterectomy  Problem List Items Addressed This Visit       Asthma    Relevant Medications    albuterol 90 mcg/actuation inhaler    Symbicort 160-4.5 mcg/actuation inhaler    montelukast (Singulair) 10 mg tablet    Benign essential hypertension    Relevant Medications     famotidine (Pepcid) 20 mg tablet    amLODIPine (Norvasc) 5 mg tablet    Chronic cough    Relevant Medications    benzonatate (Tessalon) 100 mg capsule    Diabetes mellitus type 2 without retinopathy (Multi)    Relevant Medications    dulaglutide (Trulicity) 1.5 mg/0.5 mL pen injector injection (Start on 2/16/2025)    Gout    Relevant Medications    allopurinol (Zyloprim) 100 mg tablet    Insomnia    Relevant Medications    traZODone (Desyrel) 100 mg tablet     Other Visit Diagnoses       Medicare annual wellness visit, subsequent    -  Primary    Relevant Orders    Referral to Ophthalmology    Referral to [facility]    Colonoscopy Screening; High Risk Patient    BI mammo bilateral screening tomosynthesis    Lipid panel    XR DEXA bone density    Mild episode of recurrent major depressive disorder (CMS-HCC)        Hyperlipidemia, unspecified hyperlipidemia type        Relevant Medications    atorvastatin (Lipitor) 40 mg tablet    Allergy, subsequent encounter        Relevant Medications    fluticasone (Flonase) 50 mcg/actuation nasal spray    Encounter for screening mammogram for malignant neoplasm of breast        Relevant Orders    BI mammo bilateral screening tomosynthesis    Encounter for osteoporosis screening in asymptomatic postmenopausal patient        Relevant Orders    XR DEXA bone density            Patient seen and discussed with attending physician Dr. Mendez    RTC in 2 months, or earlier as needed.    Reviewed and approved by VIBHA MARIA on 2/14/25 at 3:29 PM.

## 2025-02-14 NOTE — PATIENT INSTRUCTIONS
It was so great to see you today Dot Sevilla  . Today we discussed:    - STOP Tenormin (combined medication) & START atenolol 50mg only with amlodipine for blood pressure  - Schedule with Kidney doctor   - Get blood work & screenings      Call [phone]  For Care if you need to schedule appointment with specialist or images.  IF any labs were ordered today, I will CALL if labs are ABNORMAL. If labs are NORMAL then I will comment on MyChart and mail results to you.    Follow up in 3 months      You were see by:    Dr. Steffi Hilario D.O.  [facility]   Phone: [phone]

## 2025-02-15 LAB
CHOLEST SERPL-MCNC: 138 MG/DL
CHOLEST/HDLC SERPL: 2.6 (CALC)
HDLC SERPL-MCNC: 53 MG/DL
LDLC SERPL CALC-MCNC: 68 MG/DL (CALC)
NONHDLC SERPL-MCNC: 85 MG/DL (CALC)
TRIGL SERPL-MCNC: 89 MG/DL

## 2025-02-17 ENCOUNTER — TELEPHONE (OUTPATIENT)
Dept: PRIMARY CARE | Facility: CLINIC | Age: 65
End: 2025-02-17
Payer: COMMERCIAL

## 2025-02-17 NOTE — TELEPHONE ENCOUNTER
[facility] called to see if you would follow patient for home care services? Nursing, Physical Therapy, and Occupational Therapy. Please call [phone]. Also [facility] states that patient doesn't check her blood sugar and you may want to reach out to patient.

## 2025-02-26 ENCOUNTER — TELEPHONE (OUTPATIENT)
Facility: HOSPITAL | Age: 65
End: 2025-02-26

## 2025-03-05 NOTE — TELEPHONE ENCOUNTER
Patient would like a call back from the office in regards to needing the form that was sent by the [employer]. The last form that was faxed back to the company was blank. And she would just like a new one sent with the information filled out and be verified with her

## 2025-03-19 DIAGNOSIS — T78.40XD ALLERGY, SUBSEQUENT ENCOUNTER: ICD-10-CM

## 2025-03-19 DIAGNOSIS — G47.00 INSOMNIA, UNSPECIFIED TYPE: ICD-10-CM

## 2025-03-21 RX ORDER — FLUTICASONE PROPIONATE 50 MCG
1-2 SPRAY, SUSPENSION (ML) NASAL DAILY
Qty: 16 G | Refills: 0 | Status: SHIPPED | OUTPATIENT
Start: 2025-03-21

## 2025-03-21 RX ORDER — TRAZODONE HYDROCHLORIDE 100 MG/1
100 TABLET ORAL NIGHTLY
Qty: 30 TABLET | Refills: 0 | Status: SHIPPED | OUTPATIENT
Start: 2025-03-21 | End: 2025-04-20

## 2025-03-25 ENCOUNTER — CLINICAL SUPPORT (OUTPATIENT)
Facility: HOSPITAL | Age: 65
End: 2025-03-25
Payer: COMMERCIAL

## 2025-03-25 NOTE — PROGRESS NOTES
[facility]  Diet Recommendation 1: Healthy Eating  Food Intolerance Avoidance: NKFA  Household Size: 5 Members (4 Max/Household)  Interventions: Referral Number: 4th 6 Mo Referral 2 yrs (Referrals may not be consecutive)  Interventions: Visit Number: 5 of 6 Visits - Max 6 Visits/Referral Each 6 Mo Period  Grains: 25-50% Whole  Fruit: 50-75% Fresh  Vegetables: 50-75% Fresh  Proteins: 1-2 Plant-based Items  Dairy: 25-50% Lowfat  Originating Site of Referral Order: [facility]  Initials of RD Assisting Today: DESIREE

## 2025-04-09 DIAGNOSIS — T78.40XD ALLERGY, SUBSEQUENT ENCOUNTER: ICD-10-CM

## 2025-04-10 RX ORDER — FLUTICASONE PROPIONATE 50 MCG
1-2 SPRAY, SUSPENSION (ML) NASAL DAILY
Qty: 16 G | Refills: 0 | Status: SHIPPED | OUTPATIENT
Start: 2025-04-10

## 2025-06-09 ENCOUNTER — APPOINTMENT (OUTPATIENT)
Dept: GASTROENTEROLOGY | Facility: HOSPITAL | Age: 65
End: 2025-06-09
Payer: COMMERCIAL

## 2025-07-01 ENCOUNTER — APPOINTMENT (OUTPATIENT)
Dept: OPHTHALMOLOGY | Facility: CLINIC | Age: 65
End: 2025-07-01
Payer: COMMERCIAL

## 2025-07-17 ENCOUNTER — APPOINTMENT (OUTPATIENT)
Dept: DERMATOLOGY | Facility: CLINIC | Age: 65
End: 2025-07-17
Payer: COMMERCIAL

## 2025-07-17 DIAGNOSIS — L98.491 ULCER OF SKIN, CHRONIC, LIMITED TO BREAKDOWN OF SKIN: ICD-10-CM

## 2025-07-17 DIAGNOSIS — L91.8 SKIN TAG: ICD-10-CM

## 2025-07-17 PROCEDURE — 99203 OFFICE O/P NEW LOW 30 MIN: CPT | Performed by: DERMATOLOGY

## 2025-07-17 PROCEDURE — 1159F MED LIST DOCD IN RCRD: CPT | Performed by: DERMATOLOGY

## 2025-07-17 RX ORDER — MUPIROCIN 20 MG/G
OINTMENT TOPICAL
Qty: 22 G | Refills: 0 | Status: SHIPPED | OUTPATIENT
Start: 2025-07-17 | End: 2025-07-27

## 2025-07-17 ASSESSMENT — DERMATOLOGY PATIENT ASSESSMENT
DO YOU USE A TANNING BED: NO
DO YOU HAVE IRREGULAR MENSTRUAL CYCLES: NO
HAVE YOU HAD OR DO YOU HAVE A STAPH INFECTION: NO
HAVE YOU HAD OR DO YOU HAVE VASCULAR DISEASE: NO
ARE YOU AN ORGAN TRANSPLANT RECIPIENT: NO
ARE YOU ON BIRTH CONTROL: NO
DO YOU HAVE ANY NEW OR CHANGING LESIONS: YES
ARE YOU TRYING TO GET PREGNANT: NO

## 2025-07-17 ASSESSMENT — DERMATOLOGY QUALITY OF LIFE (QOL) ASSESSMENT: ARE THERE EXCLUSIONS OR EXCEPTIONS FOR THE QUALITY OF LIFE ASSESSMENT: NO

## 2025-07-17 ASSESSMENT — ITCH NUMERIC RATING SCALE: HOW SEVERE IS YOUR ITCHING?: 0

## 2025-07-17 NOTE — Clinical Note
Possible syphilis versus hidradenititis suppurativa vs HSV less likely.  Syphilis blood test.  Swab sent for VZV and HSV.  Mupirocin ointment for now.

## 2025-07-17 NOTE — PROGRESS NOTES
Subjective     Dot Coles is a 64 y.o. female who presents for the following: Suspicious Skin Lesion (genital area. No personal/family history of skin cancer. ).  Painful and itches. It has been there a while. No treatments. Saw obgyn. She doesn't get pap smears because she has had a hysterectomy. One partner for 10 years.     Intake Questions  Do you have any new or changing Lesions?: Yes  Are you an organ transplant recipient?: No  Have you had or do you have a Staph Infection?: No  Have you had or do you have Vacular Disease?: No  Do you use sunscreen?: None  Do you use a tanning bed?: No  Are you trying to get pregnant?: No  Are you on birth control?: No  Do you have irregular menstrual cycles?: No    Review of Systems:  No other skin or systemic complaints other than what is documented elsewhere in the note.    The following portions of the chart were reviewed this encounter and updated as appropriate:          Skin Cancer History  Biopsy Log Book  No skin cancers from Specimen Tracking.    Additional History      Specialty Problems          Dermatology Problems    Xerosis cutis    Bruising    Intertrigo    Itching    Comment on above: ON BREASTS;             Objective   Well appearing patient in no apparent distress; mood and affect are within normal limits.    A focused skin examination was performed. All findings within normal limits unless otherwise noted below.    Assessment/Plan   Skin Exam

## 2025-07-20 LAB
HSV1 DNA SPEC QL NAA+PROBE: NOT DETECTED
HSV2 DNA SPEC QL NAA+PROBE: NOT DETECTED
SPECIMEN SOURCE: NORMAL
SPECIMEN SOURCE: NORMAL
VZV DNA SPEC QL NAA+PROBE: NOT DETECTED

## 2025-08-01 ENCOUNTER — APPOINTMENT (OUTPATIENT)
Dept: OPHTHALMOLOGY | Facility: CLINIC | Age: 65
End: 2025-08-01
Payer: COMMERCIAL

## (undated) DEVICE — SUTURE, ETHILON, 3-0, 30 IN, FS-1, BLACK

## (undated) DEVICE — BANDAGE, ESMARK, 6 IN X 9 FT, STERILE

## (undated) DEVICE — COVER HANDLE LIGHT, STERIS, BLUE, STERILE

## (undated) DEVICE — DRAPE, SHEET, ARTHROSCOPY, W/POUCH, 92 X 102 IN, DISPOSABLE, LF, STERILE

## (undated) DEVICE — KIT, MINOR, DOUBLE BASIN

## (undated) DEVICE — APPLICATOR, CHLORAPREP, W/ORANGE TINT, 26ML

## (undated) DEVICE — NEEDLE, FILTER 19 G X 1 IN

## (undated) DEVICE — SYRINGE, LUER LOCK, 12ML

## (undated) DEVICE — GOWN, SURGICAL, IMPLT, BACK, DISPOSABLE, XLARGE, STERILE

## (undated) DEVICE — TUBING, PATIENT 8FT STERILE

## (undated) DEVICE — Device

## (undated) DEVICE — GLOVE, SURGICAL, PROTEXIS PI ORTHO, 8.0, PF, LF

## (undated) DEVICE — TUBING, SUCTION, NON-CONDUCTIVE, W/CONNECT,.25 IN X 12 FT, STERILE, LF

## (undated) DEVICE — SOLUTION, IRRI GATION, LACTATED RINGERS, 3000 ML, BAG

## (undated) DEVICE — GLOVE, SURGICAL, PROTEXIS PI MICRO, 8.0, PF, LF

## (undated) DEVICE — TOWEL, SURGICAL, NEURO, O/R, 16 X 26, BLUE, STERILE

## (undated) DEVICE — MAT, FLOOR, SURGISAFE, FLUID CONTROL, 46X40